# Patient Record
Sex: FEMALE | Race: WHITE | NOT HISPANIC OR LATINO | Employment: UNEMPLOYED | URBAN - METROPOLITAN AREA
[De-identification: names, ages, dates, MRNs, and addresses within clinical notes are randomized per-mention and may not be internally consistent; named-entity substitution may affect disease eponyms.]

---

## 2022-02-09 ENCOUNTER — HOSPITAL ENCOUNTER (INPATIENT)
Facility: HOSPITAL | Age: 42
LOS: 4 days | Discharge: HOME | End: 2022-02-13
Attending: OBSTETRICS & GYNECOLOGY | Admitting: OBSTETRICS & GYNECOLOGY

## 2022-02-09 PROBLEM — O13.9 GESTATIONAL HYPERTENSION: Status: ACTIVE | Noted: 2022-02-09

## 2022-02-09 LAB
ABO GROUP BLD: NORMAL
ABO GROUP BLD: NORMAL
ALP SERPL-CCNC: 247 U/L (ref 39–117)
ALT SERPL W P-5'-P-CCNC: 216 U/L (ref 1–33)
AST SERPL-CCNC: 203 U/L (ref 1–32)
BILIRUB SERPL-MCNC: 0.7 MG/DL (ref 0–1.2)
BLD GP AB SCN SERPL QL: NEGATIVE
CREAT SERPL-MCNC: 0.53 MG/DL (ref 0.57–1)
CREAT SERPL-MCNC: 0.53 MG/DL (ref 0.57–1)
DEPRECATED RDW RBC AUTO: 46.5 FL (ref 37–54)
ERYTHROCYTE [DISTWIDTH] IN BLOOD BY AUTOMATED COUNT: 13.5 % (ref 12.3–15.4)
GFR SERPL CREATININE-BSD FRML MDRD: 128 ML/MIN/1.73
GLUCOSE BLDC GLUCOMTR-MCNC: 132 MG/DL (ref 70–130)
GLUCOSE BLDC GLUCOMTR-MCNC: 94 MG/DL (ref 70–130)
HCT VFR BLD AUTO: 39.4 % (ref 34–46.6)
HGB BLD-MCNC: 13.5 G/DL (ref 12–15.9)
LDH SERPL-CCNC: 463 U/L (ref 135–214)
MCH RBC QN AUTO: 32.4 PG (ref 26.6–33)
MCHC RBC AUTO-ENTMCNC: 34.3 G/DL (ref 31.5–35.7)
MCV RBC AUTO: 94.5 FL (ref 79–97)
PLATELET # BLD AUTO: 317 10*3/MM3 (ref 140–450)
PMV BLD AUTO: 10.8 FL (ref 6–12)
RBC # BLD AUTO: 4.17 10*6/MM3 (ref 3.77–5.28)
RH BLD: POSITIVE
RH BLD: POSITIVE
SARS-COV-2 RDRP RESP QL NAA+PROBE: NORMAL
T&S EXPIRATION DATE: NORMAL
URATE SERPL-MCNC: 6.3 MG/DL (ref 2.4–5.7)
WBC NRBC COR # BLD: 14.17 10*3/MM3 (ref 3.4–10.8)

## 2022-02-09 PROCEDURE — 82247 BILIRUBIN TOTAL: CPT | Performed by: OBSTETRICS & GYNECOLOGY

## 2022-02-09 PROCEDURE — 84075 ASSAY ALKALINE PHOSPHATASE: CPT | Performed by: OBSTETRICS & GYNECOLOGY

## 2022-02-09 PROCEDURE — 86901 BLOOD TYPING SEROLOGIC RH(D): CPT | Performed by: OBSTETRICS & GYNECOLOGY

## 2022-02-09 PROCEDURE — 82962 GLUCOSE BLOOD TEST: CPT

## 2022-02-09 PROCEDURE — 82239 BILE ACIDS TOTAL: CPT | Performed by: OBSTETRICS & GYNECOLOGY

## 2022-02-09 PROCEDURE — 82565 ASSAY OF CREATININE: CPT | Performed by: OBSTETRICS & GYNECOLOGY

## 2022-02-09 PROCEDURE — 86850 RBC ANTIBODY SCREEN: CPT | Performed by: OBSTETRICS & GYNECOLOGY

## 2022-02-09 PROCEDURE — 86901 BLOOD TYPING SEROLOGIC RH(D): CPT

## 2022-02-09 PROCEDURE — 85027 COMPLETE CBC AUTOMATED: CPT | Performed by: OBSTETRICS & GYNECOLOGY

## 2022-02-09 PROCEDURE — 84460 ALANINE AMINO (ALT) (SGPT): CPT | Performed by: OBSTETRICS & GYNECOLOGY

## 2022-02-09 PROCEDURE — 86900 BLOOD TYPING SEROLOGIC ABO: CPT

## 2022-02-09 PROCEDURE — 59025 FETAL NON-STRESS TEST: CPT | Performed by: OBSTETRICS & GYNECOLOGY

## 2022-02-09 PROCEDURE — 84450 TRANSFERASE (AST) (SGOT): CPT | Performed by: OBSTETRICS & GYNECOLOGY

## 2022-02-09 PROCEDURE — 0 MAGNESIUM SULFATE 20 GM/500ML SOLUTION: Performed by: OBSTETRICS & GYNECOLOGY

## 2022-02-09 PROCEDURE — 99221 1ST HOSP IP/OBS SF/LOW 40: CPT | Performed by: OBSTETRICS & GYNECOLOGY

## 2022-02-09 PROCEDURE — 84550 ASSAY OF BLOOD/URIC ACID: CPT | Performed by: OBSTETRICS & GYNECOLOGY

## 2022-02-09 PROCEDURE — 83615 LACTATE (LD) (LDH) ENZYME: CPT | Performed by: OBSTETRICS & GYNECOLOGY

## 2022-02-09 PROCEDURE — 87635 SARS-COV-2 COVID-19 AMP PRB: CPT | Performed by: OBSTETRICS & GYNECOLOGY

## 2022-02-09 PROCEDURE — 86900 BLOOD TYPING SEROLOGIC ABO: CPT | Performed by: OBSTETRICS & GYNECOLOGY

## 2022-02-09 RX ORDER — LABETALOL HYDROCHLORIDE 5 MG/ML
20-80 INJECTION, SOLUTION INTRAVENOUS
Status: ACTIVE | OUTPATIENT
Start: 2022-02-09 | End: 2022-02-10

## 2022-02-09 RX ORDER — PRENATAL WITH FERROUS FUM AND FOLIC ACID 3080; 920; 120; 400; 22; 1.84; 3; 20; 10; 1; 12; 200; 27; 25; 2 [IU]/1; [IU]/1; MG/1; [IU]/1; MG/1; MG/1; MG/1; MG/1; MG/1; MG/1; UG/1; MG/1; MG/1; MG/1; MG/1
TABLET ORAL DAILY
COMMUNITY

## 2022-02-09 RX ORDER — MAGNESIUM SULFATE HEPTAHYDRATE 40 MG/ML
1 INJECTION, SOLUTION INTRAVENOUS CONTINUOUS
Status: DISCONTINUED | OUTPATIENT
Start: 2022-02-09 | End: 2022-02-13 | Stop reason: HOSPADM

## 2022-02-09 RX ORDER — BETAMETHASONE SODIUM PHOSPHATE AND BETAMETHASONE ACETATE 3; 3 MG/ML; MG/ML
12 INJECTION, SUSPENSION INTRA-ARTICULAR; INTRALESIONAL; INTRAMUSCULAR; SOFT TISSUE EVERY 24 HOURS
Status: DISCONTINUED | OUTPATIENT
Start: 2022-02-09 | End: 2022-02-09

## 2022-02-09 RX ORDER — SODIUM CHLORIDE 0.9 % (FLUSH) 0.9 %
1-10 SYRINGE (ML) INJECTION AS NEEDED
Status: DISCONTINUED | OUTPATIENT
Start: 2022-02-09 | End: 2022-02-13 | Stop reason: HOSPADM

## 2022-02-09 RX ORDER — LIDOCAINE HYDROCHLORIDE 10 MG/ML
5 INJECTION, SOLUTION EPIDURAL; INFILTRATION; INTRACAUDAL; PERINEURAL AS NEEDED
Status: DISCONTINUED | OUTPATIENT
Start: 2022-02-09 | End: 2022-02-13 | Stop reason: HOSPADM

## 2022-02-09 RX ORDER — FLUOXETINE 10 MG/1
10 CAPSULE ORAL DAILY
COMMUNITY

## 2022-02-09 RX ORDER — FLUOXETINE 10 MG/1
10 CAPSULE ORAL DAILY
Status: DISCONTINUED | OUTPATIENT
Start: 2022-02-10 | End: 2022-02-13 | Stop reason: HOSPADM

## 2022-02-09 RX ORDER — SODIUM CHLORIDE 0.9 % (FLUSH) 0.9 %
10 SYRINGE (ML) INJECTION EVERY 12 HOURS SCHEDULED
Status: DISCONTINUED | OUTPATIENT
Start: 2022-02-09 | End: 2022-02-13 | Stop reason: HOSPADM

## 2022-02-09 RX ORDER — ONDANSETRON 4 MG/1
4 TABLET, FILM COATED ORAL EVERY 8 HOURS PRN
Status: DISCONTINUED | OUTPATIENT
Start: 2022-02-09 | End: 2022-02-13 | Stop reason: HOSPADM

## 2022-02-09 RX ORDER — URSODIOL 300 MG/1
300 CAPSULE ORAL 3 TIMES DAILY
Status: DISCONTINUED | OUTPATIENT
Start: 2022-02-09 | End: 2022-02-11

## 2022-02-09 RX ORDER — ONDANSETRON 2 MG/ML
4 INJECTION INTRAMUSCULAR; INTRAVENOUS EVERY 8 HOURS PRN
Status: DISCONTINUED | OUTPATIENT
Start: 2022-02-09 | End: 2022-02-13 | Stop reason: HOSPADM

## 2022-02-09 RX ORDER — DEXTROSE AND SODIUM CHLORIDE 5; .2 G/100ML; G/100ML
100 INJECTION, SOLUTION INTRAVENOUS CONTINUOUS
Status: DISCONTINUED | OUTPATIENT
Start: 2022-02-09 | End: 2022-02-10

## 2022-02-09 RX ORDER — ACETAMINOPHEN 325 MG/1
650 TABLET ORAL EVERY 4 HOURS PRN
Status: DISCONTINUED | OUTPATIENT
Start: 2022-02-09 | End: 2022-02-13 | Stop reason: HOSPADM

## 2022-02-09 RX ORDER — HYDROXYZINE HYDROCHLORIDE 25 MG/1
50 TABLET, FILM COATED ORAL NIGHTLY PRN
Status: DISCONTINUED | OUTPATIENT
Start: 2022-02-09 | End: 2022-02-13 | Stop reason: HOSPADM

## 2022-02-09 RX ORDER — BETAMETHASONE SODIUM PHOSPHATE AND BETAMETHASONE ACETATE 3; 3 MG/ML; MG/ML
12 INJECTION, SUSPENSION INTRA-ARTICULAR; INTRALESIONAL; INTRAMUSCULAR; SOFT TISSUE ONCE
Status: COMPLETED | OUTPATIENT
Start: 2022-02-10 | End: 2022-02-10

## 2022-02-09 RX ORDER — LEVOTHYROXINE SODIUM 0.12 MG/1
125 TABLET ORAL DAILY
COMMUNITY

## 2022-02-09 RX ADMIN — MAGNESIUM SULFATE IN WATER 1 G/HR: 40 INJECTION, SOLUTION INTRAVENOUS at 19:00

## 2022-02-09 RX ADMIN — METFORMIN HYDROCHLORIDE 500 MG: 500 TABLET ORAL at 22:00

## 2022-02-09 RX ADMIN — URSODIOL 300 MG: 300 CAPSULE ORAL at 23:04

## 2022-02-09 RX ADMIN — HYDROXYZINE HYDROCHLORIDE 50 MG: 25 TABLET ORAL at 20:52

## 2022-02-09 NOTE — H&P
[facility]  Obstetric History and Physical    Chief Complaint   Patient presents with   • Elevated Hepatic Enzymes       Subjective     Patient is a 41 y.o. female  currently at 33w4d, who presents on referral from Dr. Chester in [place], [place].  Patient is transferred secondary to gestational hypertension with elevated hepatic transaminases.  Patient reports headache for the past 1-2 weeks.  She denies scotomata or epigastric pain.  She has no prior history of hypertensive disorders.  She is not currently complaining of any unusual itching.  Patient was seen in [place] and started on magnesium sulfate.  She received her first dose of  steroids.    Her prenatal care is notable for advanced maternal age. Her previous obstetric/gynecological history is notable for history of previous  section.    The following portions of the patients history were reviewed and updated as appropriate: current medications, allergies, past medical history, past surgical history, past family history, past social history and problem list .        Prenatal Information:   Maternal Prenatal Labs  Blood Type No results found for: ABO   Rh Status No results found for: RH   Antibody Screen No results found for: ABSCRN   Gonnorhea No results found for: GCCX   Chlamydia No results found for: CLAMYDCU   RPR No results found for: RPR   Syphilis Antibody No results found for: SYPHILIS   Rubella No results found for: RUBELLAIGGIN   Hepatitis B Surface Antigen No results found for: HEPBSAG   HIV-1 Antibody No results found for: LABHIV1   Hepatitis C Antibody No results found for: HEPCAB   Rapid Urin Drug Screen No results found for: AMPMETHU, BARBITSCNUR, LABBENZSCN, LABMETHSCN, LABOPIASCN, THCURSCR, COCAINEUR, AMPHETSCREEN, PROPOXSCN, BUPRENORSCNU, METAMPSCNUR, OXYCODONESCN, TRICYCLICSCN   Group B Strep Culture No results found for: GBSANTIGEN                 Past OB History:       OB History    Para Term  AB  "Living   6 3 3 0 1 3   SAB IAB Ectopic Molar Multiple Live Births   1 0 0 0 0 3      # Outcome Date GA Lbr [place]/2nd Weight Sex Delivery Anes PTL Lv   6 Current            5 Term 08 38w0d  3969 g (8 lb 12 oz) M Vag-Spont   ELIJAH   4 Term 05 39w4d  3685 g (8 lb 2 oz) F Vag-Spont   ELIJAH   3 Term 03 39w6d  3572 g (7 lb 14 oz) F Vag-Spont   ELIJAH   2             1 SAB  6w0d              Past Medical History:  Past Medical History:   Diagnosis Date   • Anxiety    • Chlamydia     20 yrs ago   • Depression    • Disease of thyroid gland    • Gestational diabetes    • Preeclampsia    • Seizures (HCC)     last was 14 years ago \"stress induced\"      Past Surgical History Past Surgical History:   Procedure Laterality Date   •  SECTION     • DILATATION AND CURETTAGE     • KNEE SURGERY Bilateral        • WISDOM TOOTH EXTRACTION        Family History: No family history on file.   Social History:  reports that she has never smoked. She has never used smokeless tobacco.   reports previous alcohol use.   reports no history of drug use.   Allergies: Penicillins  Current Medications:          No current facility-administered medications on file prior to encounter.     Current Outpatient Medications on File Prior to Encounter   Medication Sig Dispense Refill   • FLUoxetine (PROzac) 10 MG capsule Take 10 mg by mouth Daily.     • levothyroxine (SYNTHROID, LEVOTHROID) 125 MCG tablet Take 125 mcg by mouth Daily.     • metFORMIN (GLUCOPHAGE) 500 MG tablet Take 500 mg by mouth 2 (Two) Times a Day With Meals.     • Prenatal Vit-Fe Fumarate-FA (Prenatal 27-) 27-1 MG tablet tablet Take  by mouth Daily.         General ROS: Pertinent items are noted in HPI, all other systems reviewed and negative    Objective       Vital Signs Range for the last 24 hours  Temperature:     Temp Source:     BP: BP: (119)/(82) 119/82   Pulse: Heart Rate:  [80] 80   Respirations: Resp:  [18] 18   SPO2:     O2 Amount (l/min):     O2 " Devices     Weight: Weight:  [113 kg (250 lb)] 113 kg (250 lb)     Physical Examination: General appearance - alert, well appearing, and in no distress, oriented to person, place, and time and overweight  Mental status - alert, oriented to person, place, and time, normal mood, behavior, speech, dress, motor activity, and thought processes  Eyes - pupils equal and reactive, extraocular eye movements intact, sclera anicteric  Neck - supple, no significant adenopathy, thyroid exam: thyroid is normal in size without nodules or tenderness  Chest - clear to auscultation, no wheezes, rales or rhonchi, symmetric air entry  Heart - normal rate, regular rhythm, normal S1, S2, no murmurs, rubs, clicks or gallops  Abdomen-soft, nontender, nondistended, no masses or organomegaly   Abdomen, Non-Tender  Neurological - alert, oriented, normal speech, no focal findings or movement disorder noted, DTR's normal and symmetric  Extremities - no pedal edema noted    Fetal Heart Rate Assessment  Indication: hepatic dysfunction   Start Time: 1733                end Time: 1824   NST Results: Reactive NST.  Fetal heart rate baseline 125-135 bpm.  Average variability with 15 x 15 accelerations.  No decelerations no regular uterine contraction pattern.        Laboratory Results:   No results found for: ALKPHOS, ALT, AST, CREATININE, BILITOT, LDH, URICACID    No results found for: WBC, RBC, HGB, HCT, MCV, MCH, MCHC, RDW, RDWSD, MPV, PLT, NEUTRORELPCT, LYMPHORELPCT, MONORELPCT, EOSRELPCT, BASORELPCT, AUTOIGPER, NEUTROABS, LYMPHSABS, MONOSABS, EOSABS, BASOSABS, AUTOIGNUM, NRBC          Brief Urine Lab Results     None            Radiology Review: PDC ultrasound ordered.  Scheduled for tomorrow morning.  Other Studies: CBC, PEP, type and screen, bile salts, 24-hour urine for protein and creatinine clearance.  Also COVID-19 screening.    Assessment/Plan       Gestational hypertension        Assessment:  1. Gestational hypertension at 33  "weeks 4 days gestation.  No prior history of hypertensive disorders.  2. Previous  section.  3. Advanced maternal age.    Plan:  1. Admit.  2. Continue magnesium sulfate for seizure prophylaxis.  3. Second dose of  betamethasone tomorrow afternoon.  4. CBC, PEP, 24-hour urine for protein and creatinine clearance.  5. PDC ultrasound to assess fetal growth and Doppler studies.  6. MFM consultation.  7. 3 times daily  testing.     Total time spent today with Carmenza  was 40-54 minutes (level 5).  Of this time, > 50% was spent face-to-face time coordinating care, answering her questions and counseling regarding pathophysiology of her presenting problem along with plans for any diagnostic work-up and treatment.        Carl Singletary \"[place]\" PRIYA Duke MD  2022  18:21 EST    "

## 2022-02-10 ENCOUNTER — APPOINTMENT (OUTPATIENT)
Dept: WOMENS IMAGING | Facility: HOSPITAL | Age: 42
End: 2022-02-10

## 2022-02-10 PROBLEM — O34.211 MATERNAL CARE DUE TO LOW TRANSVERSE UTERINE SCAR FROM PREVIOUS CESAREAN DELIVERY: Status: ACTIVE | Noted: 2022-02-10

## 2022-02-10 PROBLEM — O09.523 MULTIGRAVIDA OF ADVANCED MATERNAL AGE IN THIRD TRIMESTER: Status: ACTIVE | Noted: 2022-02-10

## 2022-02-10 LAB
ALP SERPL-CCNC: 224 U/L (ref 39–117)
ALT SERPL W P-5'-P-CCNC: 258 U/L (ref 1–33)
AST SERPL-CCNC: 243 U/L (ref 1–32)
BASOPHILS # BLD AUTO: 0.02 10*3/MM3 (ref 0–0.2)
BASOPHILS NFR BLD AUTO: 0.1 % (ref 0–1.5)
BILIRUB SERPL-MCNC: 0.7 MG/DL (ref 0–1.2)
COLLECT DURATION TIME UR: 24 HRS
COLLECT DURATION TIME UR: 24 HRS
CREAT CL 24H UR+SERPL-VRATE: 112.1 ML/MIN (ref 88–128)
CREAT CL 24H UR+SERPL-VRATE: 161.5 L/24 HR (ref 126.7–184.3)
CREAT SERPL-MCNC: 0.58 MG/DL (ref 0.57–1)
CREAT UR-MCNC: 38.4 MG/DL
CREATINE 24H UR-MRATE: 1.23 G/24 HR (ref 0.7–1.6)
DEPRECATED RDW RBC AUTO: 47.4 FL (ref 37–54)
EOSINOPHIL # BLD AUTO: 0 10*3/MM3 (ref 0–0.4)
EOSINOPHIL NFR BLD AUTO: 0 % (ref 0.3–6.2)
ERYTHROCYTE [DISTWIDTH] IN BLOOD BY AUTOMATED COUNT: 13.7 % (ref 12.3–15.4)
GLUCOSE BLDC GLUCOMTR-MCNC: 109 MG/DL (ref 70–130)
GLUCOSE BLDC GLUCOMTR-MCNC: 118 MG/DL (ref 70–130)
GLUCOSE BLDC GLUCOMTR-MCNC: 123 MG/DL (ref 70–130)
GLUCOSE BLDC GLUCOMTR-MCNC: 133 MG/DL (ref 70–130)
HCT VFR BLD AUTO: 38.7 % (ref 34–46.6)
HGB BLD-MCNC: 13 G/DL (ref 12–15.9)
IMM GRANULOCYTES # BLD AUTO: 0.09 10*3/MM3 (ref 0–0.05)
IMM GRANULOCYTES NFR BLD AUTO: 0.6 % (ref 0–0.5)
LDH SERPL-CCNC: 338 U/L (ref 135–214)
LYMPHOCYTES # BLD AUTO: 1.6 10*3/MM3 (ref 0.7–3.1)
LYMPHOCYTES NFR BLD AUTO: 11.3 % (ref 19.6–45.3)
MCH RBC QN AUTO: 31.9 PG (ref 26.6–33)
MCHC RBC AUTO-ENTMCNC: 33.6 G/DL (ref 31.5–35.7)
MCV RBC AUTO: 94.9 FL (ref 79–97)
MONOCYTES # BLD AUTO: 0.64 10*3/MM3 (ref 0.1–0.9)
MONOCYTES NFR BLD AUTO: 4.5 % (ref 5–12)
NEUTROPHILS NFR BLD AUTO: 11.75 10*3/MM3 (ref 1.7–7)
NEUTROPHILS NFR BLD AUTO: 83.5 % (ref 42.7–76)
NRBC BLD AUTO-RTO: 0 /100 WBC (ref 0–0.2)
PLATELET # BLD AUTO: 290 10*3/MM3 (ref 140–450)
PMV BLD AUTO: 10.7 FL (ref 6–12)
PROT 24H UR-MRATE: 211.2 MG/24HOURS (ref 0–150)
RBC # BLD AUTO: 4.08 10*6/MM3 (ref 3.77–5.28)
SPECIMEN VOL 24H UR: 3200 ML
SPECIMEN VOL 24H UR: 3200 ML
URATE SERPL-MCNC: 6.1 MG/DL (ref 2.4–5.7)
WBC NRBC COR # BLD: 14.1 10*3/MM3 (ref 3.4–10.8)

## 2022-02-10 PROCEDURE — 25010000002 BETAMETHASONE ACET & SOD PHOS PER 4 MG: Performed by: OBSTETRICS & GYNECOLOGY

## 2022-02-10 PROCEDURE — 84450 TRANSFERASE (AST) (SGOT): CPT | Performed by: OBSTETRICS & GYNECOLOGY

## 2022-02-10 PROCEDURE — 0 MAGNESIUM SULFATE 20 GM/500ML SOLUTION: Performed by: OBSTETRICS & GYNECOLOGY

## 2022-02-10 PROCEDURE — 83615 LACTATE (LD) (LDH) ENZYME: CPT | Performed by: OBSTETRICS & GYNECOLOGY

## 2022-02-10 PROCEDURE — 84460 ALANINE AMINO (ALT) (SGPT): CPT | Performed by: OBSTETRICS & GYNECOLOGY

## 2022-02-10 PROCEDURE — 76811 OB US DETAILED SNGL FETUS: CPT

## 2022-02-10 PROCEDURE — 59025 FETAL NON-STRESS TEST: CPT

## 2022-02-10 PROCEDURE — 82565 ASSAY OF CREATININE: CPT | Performed by: OBSTETRICS & GYNECOLOGY

## 2022-02-10 PROCEDURE — 85025 COMPLETE CBC W/AUTO DIFF WBC: CPT | Performed by: OBSTETRICS & GYNECOLOGY

## 2022-02-10 PROCEDURE — 82247 BILIRUBIN TOTAL: CPT | Performed by: OBSTETRICS & GYNECOLOGY

## 2022-02-10 PROCEDURE — 84156 ASSAY OF PROTEIN URINE: CPT | Performed by: OBSTETRICS & GYNECOLOGY

## 2022-02-10 PROCEDURE — 76811 OB US DETAILED SNGL FETUS: CPT | Performed by: OBSTETRICS & GYNECOLOGY

## 2022-02-10 PROCEDURE — 84550 ASSAY OF BLOOD/URIC ACID: CPT | Performed by: OBSTETRICS & GYNECOLOGY

## 2022-02-10 PROCEDURE — 82575 CREATININE CLEARANCE TEST: CPT | Performed by: OBSTETRICS & GYNECOLOGY

## 2022-02-10 PROCEDURE — 99232 SBSQ HOSP IP/OBS MODERATE 35: CPT | Performed by: OBSTETRICS & GYNECOLOGY

## 2022-02-10 PROCEDURE — 84075 ASSAY ALKALINE PHOSPHATASE: CPT | Performed by: OBSTETRICS & GYNECOLOGY

## 2022-02-10 PROCEDURE — 82962 GLUCOSE BLOOD TEST: CPT

## 2022-02-10 RX ORDER — SODIUM CHLORIDE, SODIUM LACTATE, POTASSIUM CHLORIDE, CALCIUM CHLORIDE 600; 310; 30; 20 MG/100ML; MG/100ML; MG/100ML; MG/100ML
100 INJECTION, SOLUTION INTRAVENOUS CONTINUOUS
Status: DISCONTINUED | OUTPATIENT
Start: 2022-02-10 | End: 2022-02-13 | Stop reason: HOSPADM

## 2022-02-10 RX ADMIN — SODIUM CHLORIDE, POTASSIUM CHLORIDE, SODIUM LACTATE AND CALCIUM CHLORIDE 100 ML/HR: 600; 310; 30; 20 INJECTION, SOLUTION INTRAVENOUS at 23:56

## 2022-02-10 RX ADMIN — SODIUM CHLORIDE, POTASSIUM CHLORIDE, SODIUM LACTATE AND CALCIUM CHLORIDE 100 ML/HR: 600; 310; 30; 20 INJECTION, SOLUTION INTRAVENOUS at 13:56

## 2022-02-10 RX ADMIN — METFORMIN HYDROCHLORIDE 500 MG: 500 TABLET ORAL at 08:45

## 2022-02-10 RX ADMIN — HYDROXYZINE HYDROCHLORIDE 50 MG: 25 TABLET ORAL at 23:38

## 2022-02-10 RX ADMIN — URSODIOL 300 MG: 300 CAPSULE ORAL at 21:41

## 2022-02-10 RX ADMIN — URSODIOL 300 MG: 300 CAPSULE ORAL at 16:56

## 2022-02-10 RX ADMIN — FLUOXETINE 10 MG: 10 CAPSULE ORAL at 09:45

## 2022-02-10 RX ADMIN — URSODIOL 300 MG: 300 CAPSULE ORAL at 08:45

## 2022-02-10 RX ADMIN — LEVOTHYROXINE SODIUM 125 MCG: 25 TABLET ORAL at 06:28

## 2022-02-10 RX ADMIN — SODIUM CHLORIDE, POTASSIUM CHLORIDE, SODIUM LACTATE AND CALCIUM CHLORIDE 100 ML/HR: 600; 310; 30; 20 INJECTION, SOLUTION INTRAVENOUS at 03:40

## 2022-02-10 RX ADMIN — BETAMETHASONE SODIUM PHOSPHATE AND BETAMETHASONE ACETATE 12 MG: 3; 3 INJECTION, SUSPENSION INTRA-ARTICULAR; INTRALESIONAL; INTRAMUSCULAR at 16:56

## 2022-02-10 RX ADMIN — MAGNESIUM SULFATE IN WATER 1 G/HR: 40 INJECTION, SOLUTION INTRAVENOUS at 12:38

## 2022-02-10 RX ADMIN — ACETAMINOPHEN 650 MG: 325 TABLET, FILM COATED ORAL at 08:45

## 2022-02-10 RX ADMIN — METFORMIN HYDROCHLORIDE 500 MG: 500 TABLET ORAL at 17:34

## 2022-02-10 NOTE — CONSULTS
[facility]  Obstetric History and Physical  Referring Provider: Surendra Chester; MATT Chapman    Chief Complaint   Patient presents with   • Elevated Hepatic Enzymes       Subjective     Patient is a 41 y.o. female  currently at 33w5d, who presents with Itching, elevated BP and proteinuria.  Patient presented to [place] with c/o itching.  Noted to have elevated BP and proteinuria.  AST, ALT elevated, as well as Uric Acid.  Patient placed on Mag and transferred to [facility].  On admission here, itching is improved, BPs are normal.  No HA, [place]omata or Epigastric pain.  Prior pregnancy complicated by preeclampsia.  Prior C/S, plans repeat.  No family history of Preeclampsia or liver disease.  She does not smoke or use illicit drugs.    Her prenatal care is benign.  Her previous obstetric/gynecological history is noted for is non-contributory.        Prenatal Information:   Maternal Prenatal Labs  Blood Type ABO Type   Date Value Ref Range Status   2022 A  Final      Rh Status RH type   Date Value Ref Range Status   2022 Positive  Final      Antibody Screen Antibody Screen   Date Value Ref Range Status   2022 Negative  Final      Gonnorhea No results found for: GCCX   Chlamydia No results found for: CLAMYDCU   RPR No results found for: RPR       Rubella No results found for: RUBELLAIGGIN   Hepatitis B Surface Antigen No results found for: HEPBSAG   HIV-1 Antibody No results found for: LABHIV1   Hepatitis C Antibody No results found for: HEPCAB   Rapid Urin Drug Screen No results found for: AMPMETHU, BARBITSCNUR, LABBENZSCN, LABMETHSCN, LABOPIASCN, THCURSCR, COCAINEUR, AMPHETSCREEN, PROPOXSCN, BUPRENORSCNU, METAMPSCNUR, OXYCODONESCN, TRICYCLICSCN   Group B Strep Culture No results found for: GBSANTIGEN           External Prenatal Results     Pregnancy Outside Results - Transcribed From Office Records - See Scanned Records For Details     Test Value Date Time    ABO  A  22 2306    Rh  Positive  " 22 2306    Antibody Screen  Negative  22    Varicella IgG       Rubella       Hgb  13.0 g/dL 02/10/22 0605       13.5 g/dL 22    Hct  38.7 % 02/10/22 0605       39.4 % 22    Glucose Fasting GTT       Glucose Tolerance Test 1 hour       Glucose Tolerance Test 3 hour       Gonorrhea (discrete)       Chlamydia (discrete)       RPR       VDRL       Syphilis Antibody       HBsAg       Herpes Simplex Virus PCR       Herpes Simplex VIrus Culture       HIV       Hep C RNA Quant PCR       Hep C Antibody       AFP       Group B Strep       GBS Susceptibility to Clindamycin       GBS Susceptibility to Erythromycin       Fetal Fibronectin       Genetic Testing, Maternal Blood             Drug Screening     Test Value Date Time    Urine Drug Screen       Amphetamine Screen       Barbiturate Screen       Benzodiazepine Screen       Methadone Screen       Phencyclidine Screen       Opiates Screen       THC Screen       Cocaine Screen       Propoxyphene Screen       Buprenorphine Screen       Methamphetamine Screen       Oxycodone Screen       Tricyclic Antidepressants Screen             Legend    ^: Historical                          Past OB History:       OB History    Para Term  AB Living   7 4 4 0 2 4   SAB IAB Ectopic Molar Multiple Live Births   1 0 0 0 0 4      # Outcome Date GA Lbr Col[place]/2nd Weight Sex Delivery Anes PTL Lv   7 Current            6 AB 19 8w0d    SAB      5 Term 10/12/15    F CS-Unspec   ELIJAH   4 Term 08 38w0d  3969 g (8 lb 12 oz) M Vag-Spont   ELIJAH   3 Term 05 39w4d  3685 g (8 lb 2 oz) F Vag-Spont   ELIJAH   2 Term 03 39w6d  3572 g (7 lb 14 oz) F Vag-Spont   ELIJAH   1 SAB  6w0d              Past Medical History: Past Medical History:   Diagnosis Date   • Anxiety    • Chlamydia     20 yrs ago   • Depression    • Disease of thyroid gland    • Gestational diabetes    • Preeclampsia    • Seizures (HCC)     last was 14 years ago \"stress " "induced\"      Past Surgical History Past Surgical History:   Procedure Laterality Date   •  SECTION     • DILATATION AND CURETTAGE     • KNEE SURGERY Bilateral        • WISDOM TOOTH EXTRACTION        Family History: No family history on file.   Social History:  reports that she has never smoked. She has never used smokeless tobacco.   reports previous alcohol use.   reports no history of drug use.        General ROS: Pertinent items are noted in HPI, all other systems reviewed and negative    Objective       Vital Signs Range for the last 24 hours  Temperature: Temp:  [97.8 °F (36.6 °C)-98.2 °F (36.8 °C)] 97.8 °F (36.6 °C)   Temp Source: Temp src: Oral   BP: BP: (106-136)/(59-84) 131/74   Pulse: Heart Rate:  [72-92] 81   Respirations: Resp:  [16-18] 16               Weight: Weight:  [113 kg (250 lb)] 113 kg (250 lb)     Physical Examination: General appearance - alert, well appearing, and in no distress  Mental status - alert, oriented to person, place, and time  Eyes - sclera anicteric, left eye normal, right eye normal  Ears - right ear normal, left ear normal  Mouth - mucous membranes moist, pharynx normal without lesions  Neck - supple, no significant adenopathy  Chest - no tachypnea, retractions or cyanosis  Heart - normal rate and regular rhythm  Abdomen - Gravid, nontender  Back exam - full range of motion, no tenderness, palpable spasm or pain on motion  Neurological - alert, oriented, normal speech, no focal findings or movement disorder noted  Musculoskeletal - no joint tenderness, deformity or swelling  Extremities - peripheral pulses normal, no pedal edema, no clubbing or cyanosis  Skin - normal coloration and turgor, no rashes, no suspicious skin lesions noted    Presentation: Vertex   Cervix: Exam by:     Dilation:     Effacement:     Station:         Fetal Heart Rate Assessment   Method: Fetal HR Assessment Method: external   Beats/min: Fetal HR (beats/min): 145   Baseline: Fetal Heart " Baseline Rate: normal range   Varibility: Fetal HR Variability: moderate (amplitude range 6 to 25 bpm)   Accels: Fetal HR Accelerations: prolonged, greater than/equal to 15 bpm, lasting at least 15 seconds   Decels: Fetal HR Decelerations: absent   Tracing Category:       Uterine Assessment   Method: Method: external tocotransducer   Frequency (min):     Ctx Count in 10 min:     Duration:     Intensity: Contraction Intensity: no contractions   Intensity by IUPC:     Resting Tone: Uterine Resting Tone: soft by palpation   Resting Tone by IUPC:     [place] Units:       Laboratory Results: elevated, AST, ALT, Uric Acid  Normal PLTs  Radiology Review: S=D, normal PETROS and dopplers  See viewpoint by me  Other Studies: reactive tracing    Assessment/Plan       Gestational hypertension        Assessment:  1.  Intrauterine pregnancy at 33w5d weeks gestation with reactive fetal status.    2.  pre-eclampsia mild v cholestasis  3.  Obstetrical history significant for is non-contributory.  4.  GBS status: No results found for: GBSANTIGEN    Plan:  1. fetal and uterine monitoring  intermittent, maternal labs, 24 hour urine for  total protein and creatinine clearance and steroids for fetal benefits  2. Plan of care has been reviewed with patient.  3.  Risks, benefits of treatment plan have been discussed.  4.  All questions have been answered.      Douglas A. Milligan, MD  2/10/2022  08:02 EST

## 2022-02-10 NOTE — PAYOR COMM NOTE
"Carmenza Villeda (41 y.o. Female)     [place] Medicaid ID#[id number]    Medical Inpatient Admission.    From: Angella Burkett LPN, Utilization Review  Phone #[phone]  Fax #[phone]              Date of Birth Social Security Number Address Home Phone MRN    1980  [address] [place] [id number] [phone] [id number]    Sabianism Marital Status             Anglican        Admission Date Admission Type Admitting Provider Attending Provider Department, Room/Bed    22 Elective Carl Duke III, MD Allen, George Sea III, MD [facility] ANTEPARTUM, N325    Discharge Date Discharge Disposition Discharge Destination                         Attending Provider: Carl Duke III, MD    Allergies: Penicillins    Isolation: None   Infection: None   Code Status: CPR   Advance Care Planning Activity    Ht: 175.3 cm (69\")   Wt: 113 kg (250 lb)    Admission Cmt: None   Principal Problem: None                Active Insurance as of 2022     Primary Coverage     Payor Plan Insurance Group Employer/Plan Group    [facility] MEDICAID [facility] MEDICAID [id number]     Payor Plan Address Payor Plan Phone Number Payor Plan Fax Number Effective Dates    [address] [address] [phone]  2021 - None Entered    [facility] [id number]       Subscriber Name Subscriber Birth Date Member ID       CARMENZA VILLEDA 1980 [id number]                 Emergency Contacts      (Rel.) Home Phone Work Phone Mobile Phone    GUTIERREZ SIMON (Spouse) -- -- [phone]            Insurance Information                [facility] MEDICAID/ANTHEM MEDICAID Phone: [phone]    Subscriber: Ronal Carmenza Subscriber#: [id number]    Group#: [id number] Precert#: --          Problem List           Codes Noted - Resolved       Hospital    Maternal care due to low transverse uterine scar from previous  delivery ICD-10-CM: O34.211  ICD-9-CM: 654.20 2/10/2022 - Present    Multigravida of advanced " maternal age in third trimester ICD-10-CM: O09.523  ICD-9-CM: 659.63 2/10/2022 - Present    Gestational hypertension ICD-10-CM: O13.9  ICD-9-CM: 642.30 2022 - Present             History & Physical      Carl Duke III, MD at 22 1821          [facility]  Obstetric History and Physical    Chief Complaint   Patient presents with   • Elevated Hepatic Enzymes       Subjective     Patient is a 41 y.o. female  currently at 33w4d, who presents on referral from Dr. Chester in [place], [place].  Patient is transferred secondary to gestational hypertension with elevated hepatic transaminases.  Patient reports headache for the past 1-2 weeks.  She denies scotomata or epigastric pain.  She has no prior history of hypertensive disorders.  She is not currently complaining of any unusual itching.  Patient was seen in [place] and started on magnesium sulfate.  She received her first dose of  steroids.    Her prenatal care is notable for advanced maternal age. Her previous obstetric/gynecological history is notable for history of previous  section.    The following portions of the patients history were reviewed and updated as appropriate: current medications, allergies, past medical history, past surgical history, past family history, past social history and problem list .        Prenatal Information:   Maternal Prenatal Labs  Blood Type No results found for: ABO   Rh Status No results found for: RH   Antibody Screen No results found for: ABSCRN   Gonnorhea No results found for: GCCX   Chlamydia No results found for: CLAMYDCU   RPR No results found for: RPR   Syphilis Antibody No results found for: SYPHILIS   Rubella No results found for: RUBELLAIGGIN   Hepatitis B Surface Antigen No results found for: HEPBSAG   HIV-1 Antibody No results found for: LABHIV1   Hepatitis C Antibody No results found for: HEPCAB   Rapid Urin Drug Screen No results found for: AMPMETHU, BARBITSCNUR, LABBENZSCN,  "LABMETHSCN, LABOPIASCN, THCURSCR, COCAINEUR, AMPHETSCREEN, PROPOXSCN, BUPRENORSCNU, METAMPSCNUR, OXYCODONESCN, TRICYCLICSCN   Group B Strep Culture No results found for: GBSANTIGEN                 Past OB History:       OB History    Para Term  AB Living   6 3 3 0 1 3   SAB IAB Ectopic Molar Multiple Live Births   1 0 0 0 0 3      # Outcome Date GA Lbr Colton/2nd Weight Sex Delivery Anes PTL Lv   6 Current            5 Term 08 38w0d  3969 g (8 lb 12 oz) M Vag-Spont   ELIJAH   4 Term 05 39w4d  3685 g (8 lb 2 oz) F Vag-Spont   ELIJAH   3 Term 03 39w6d  3572 g (7 lb 14 oz) F Vag-Spont   ELIJAH   2             1 SAB  6w0d              Past Medical History:  Past Medical History:   Diagnosis Date   • Anxiety    • Chlamydia     20 yrs ago   • Depression    • Disease of thyroid gland    • Gestational diabetes    • Preeclampsia    • Seizures (HCC)     last was 14 years ago \"stress induced\"      Past Surgical History Past Surgical History:   Procedure Laterality Date   •  SECTION     • DILATATION AND CURETTAGE     • KNEE SURGERY Bilateral        • WISDOM TOOTH EXTRACTION        Family History: No family history on file.   Social History:  reports that she has never smoked. She has never used smokeless tobacco.   reports previous alcohol use.   reports no history of drug use.   Allergies: Penicillins  Current Medications:          No current facility-administered medications on file prior to encounter.     Current Outpatient Medications on File Prior to Encounter   Medication Sig Dispense Refill   • FLUoxetine (PROzac) 10 MG capsule Take 10 mg by mouth Daily.     • levothyroxine (SYNTHROID, LEVOTHROID) 125 MCG tablet Take 125 mcg by mouth Daily.     • metFORMIN (GLUCOPHAGE) 500 MG tablet Take 500 mg by mouth 2 (Two) Times a Day With Meals.     • Prenatal Vit-Fe Fumarate-FA (Prenatal 27-) 27-1 MG tablet tablet Take  by mouth Daily.         General ROS: Pertinent items are noted in HPI, " all other systems reviewed and negative    Objective       Vital Signs Range for the last 24 hours  Temperature:     Temp Source:     BP: BP: (119)/(82) 119/82   Pulse: Heart Rate:  [80] 80   Respirations: Resp:  [18] 18   SPO2:     O2 Amount (l/min):     O2 Devices     Weight: Weight:  [113 kg (250 lb)] 113 kg (250 lb)     Physical Examination: General appearance - alert, well appearing, and in no distress, oriented to person, place, and time and overweight  Mental status - alert, oriented to person, place, and time, normal mood, behavior, speech, dress, motor activity, and thought processes  Eyes - pupils equal and reactive, extraocular eye movements intact, sclera anicteric  Neck - supple, no significant adenopathy, thyroid exam: thyroid is normal in size without nodules or tenderness  Chest - clear to auscultation, no wheezes, rales or rhonchi, symmetric air entry  Heart - normal rate, regular rhythm, normal S1, S2, no murmurs, rubs, clicks or gallops  Abdomen-soft, nontender, nondistended, no masses or organomegaly   Abdomen, Non-Tender  Neurological - alert, oriented, normal speech, no focal findings or movement disorder noted, DTR's normal and symmetric  Extremities - no pedal edema noted    Fetal Heart Rate Assessment  Indication:   Start Time: 1733                end Time: 1824   NST Results: Reactive NST.  Fetal heart rate baseline 125-135 bpm.  Average variability with 15 x 15 accelerations.  No decelerations no regular uterine contraction pattern.        Laboratory Results:   No results found for: ALKPHOS, ALT, AST, CREATININE, BILITOT, LDH, URICACID    No results found for: WBC, RBC, HGB, HCT, MCV, MCH, MCHC, RDW, RDWSD, MPV, PLT, NEUTRORELPCT, LYMPHORELPCT, MONORELPCT, EOSRELPCT, BASORELPCT, AUTOIGPER, NEUTROABS, LYMPHSABS, MONOSABS, EOSABS, BASOSABS, AUTOIGNUM, NRBC          Brief Urine Lab Results     None            Radiology Review: PDC ultrasound ordered.  Scheduled for tomorrow  "morning.  Other Studies: CBC, PEP, type and screen, bile salts, 24-hour urine for protein and creatinine clearance.  Also COVID-19 screening.    Assessment/Plan       Gestational hypertension        Assessment:  1. Gestational hypertension at 33 weeks 4 days gestation.  No prior history of hypertensive disorders.  2. Previous  section.  3. Advanced maternal age.    Plan:  1. Admit.  2. Continue magnesium sulfate for seizure prophylaxis.  3. Second dose of  betamethasone tomorrow afternoon.  4. CBC, PEP, 24-hour urine for protein and creatinine clearance.  5. PDC ultrasound to assess fetal growth and Doppler studies.  6. MFM consultation.  7. 3 times daily  testing.     Total time spent today with Carmenza  was 40-54 minutes (level 5).  Of this time, > 50% was spent face-to-face time coordinating care, answering her questions and counseling regarding pathophysiology of her presenting problem along with plans for any diagnostic work-up and treatment.        Carl Singletary \"[place]\" PRIYA Duke MD  2022  18:21 EST      Electronically signed by Carl Duke III, MD at 22 1825       Vital Signs (last day)     Date/Time Temp Temp src Pulse Resp BP Patient Position SpO2    02/10/22 0749 -- -- 81 -- -- -- 96    02/10/22 0748 97.8 (36.6) -- 81 16 131/74 -- --    02/10/22 0629 -- -- 83 18 111/66 Lying 98    02/10/22 0533 97.9 (36.6) Oral 77 18 111/59 Lying 95    02/10/22 0440 -- -- 89 16 106/66 Lying 96    02/10/22 0337 -- -- 84 18 110/67 Lying 96    02/10/22 0237 -- -- 72 16 115/74 Lying 95    02/10/22 0135 -- -- 79 16 112/72 Lying 96    02/10/22 0032 -- -- 79 16 116/68 Lying 98    226 -- -- 80 16 112/71 Lying 96    22 -- -- 92 16 117/74 Lying 95    22 -- -- 86 16 124/76 Lying 96    22 98.2 (36.8) Oral 88 16 126/76 Lying 96    22 -- -- 83 -- -- -- 95    22 -- -- 88 16 119/81 -- --    22 -- -- 87 16 136/84 -- --    22 " 1739 -- -- 80 18 119/82 -- --            Facility-Administered Medications as of 2/10/2022   Medication Dose Route Frequency Provider Last Rate Last Admin   • acetaminophen (TYLENOL) tablet 650 mg  650 mg Oral Q4H PRN Carl Duke III, MD       • betamethasone acetate-betamethasone sodium phosphate (CELESTONE SOLUSPAN) injection 12 mg  12 mg Intramuscular Once Carl Duke III, MD       • FLUoxetine (PROzac) capsule 10 mg  10 mg Oral Daily Carl Duke III, MD       • hydrOXYzine (ATARAX) tablet 50 mg  50 mg Oral Nightly PRN Carl Duke III, MD   50 mg at 02/09/22 2052   • labetalol (NORMODYNE,TRANDATE) injection 20-80 mg  20-80 mg Intravenous Q10 Min PRN Carl Duke III, MD       • lactated ringers infusion  100 mL/hr Intravenous Continuous Marek Marinelli  mL/hr at 02/10/22 0340 100 mL/hr at 02/10/22 0340   • levothyroxine (SYNTHROID, LEVOTHROID) tablet 125 mcg  125 mcg Oral Q AM Carl Duke III, MD   125 mcg at 02/10/22 0628   • lidocaine PF 1% (XYLOCAINE) injection 5 mL  5 mL Intradermal PRN Carl Duke III, MD       • magnesium sulfate 20 GM/500ML infusion  1 g/hr Intravenous Continuous Carl Duke III, MD 25 mL/hr at 02/10/22 0629 1 g/hr at 02/10/22 0629   • metFORMIN (GLUCOPHAGE) tablet 500 mg  500 mg Oral BID With Meals Marek Marinelli MD   500 mg at 02/09/22 2200   • ondansetron (ZOFRAN) tablet 4 mg  4 mg Oral Q8H PRN Carl Duke III, MD        Or   • ondansetron (ZOFRAN) injection 4 mg  4 mg Intravenous Q8H PRN Carl Duke III, MD       • sodium chloride 0.9 % flush 1-10 mL  1-10 mL Intravenous PRN Carl Duke III, MD       • sodium chloride 0.9 % flush 10 mL  10 mL Intravenous Q12H Carl Duke III, MD       • ursodiol (ACTIGALL) capsule 300 mg  300 mg Oral TID Marek Marinelli MD   300 mg at 02/09/22 0280       Lab Results (last 24 hours)     Procedure Component Value Units Date/Time    Preeclampsia Panel [[id number]]  (Abnormal)  Collected: 02/10/22 0605    Specimen: Blood Updated: 02/10/22 0803     Alkaline Phosphatase 224 U/L      ALT (SGPT) 258 U/L      AST (SGOT) 243 U/L      Creatinine 0.58 mg/dL      Total Bilirubin 0.7 mg/dL       U/L      Comment: Specimen hemolyzed.  Results may be affected.        Uric Acid 6.1 mg/dL     POC Glucose Once [[id number]]  (Normal) Collected: 02/10/22 0710    Specimen: Blood Updated: 02/10/22 0712     Glucose 123 mg/dL      Comment: Meter: [id number] : [id number] Jenny Batista       CBC & Differential [[id number]]  (Abnormal) Collected: 02/10/22 0605    Specimen: Blood Updated: 02/10/22 0656    Narrative:      The following orders were created for panel order CBC & Differential.  Procedure                               Abnormality         Status                     ---------                               -----------         ------                     CBC Auto Differential[[id number]]        Abnormal            Final result                 Please view results for these tests on the individual orders.    CBC Auto Differential [[id number]]  (Abnormal) Collected: 02/10/22 0605    Specimen: Blood Updated: 02/10/22 0656     WBC 14.10 10*3/mm3      RBC 4.08 10*6/mm3      Hemoglobin 13.0 g/dL      Hematocrit 38.7 %      MCV 94.9 fL      MCH 31.9 pg      MCHC 33.6 g/dL      RDW 13.7 %      RDW-SD 47.4 fl      MPV 10.7 fL      Platelets 290 10*3/mm3      Neutrophil % 83.5 %      Lymphocyte % 11.3 %      Monocyte % 4.5 %      Eosinophil % 0.0 %      Basophil % 0.1 %      Immature Grans % 0.6 %      Neutrophils, Absolute 11.75 10*3/mm3      Lymphocytes, Absolute 1.60 10*3/mm3      Monocytes, Absolute 0.64 10*3/mm3      Eosinophils, Absolute 0.00 10*3/mm3      Basophils, Absolute 0.02 10*3/mm3      Immature Grans, Absolute 0.09 10*3/mm3      nRBC 0.0 /100 WBC     POC Glucose Once [[id number]]  (Abnormal) Collected: 02/09/22 2132    Specimen: Blood Updated: 02/09/22 2133     Glucose 132 mg/dL      Comment:  Meter: [id number] : [id number] Kylah Samuel       Preeclampsia Panel [[id number]]  (Abnormal) Collected: 02/09/22 2014    Specimen: Blood Updated: 02/09/22 2050     Alkaline Phosphatase 247 U/L      ALT (SGPT) 216 U/L      Comment: Specimen hemolyzed.  Results may be affected.        AST (SGOT) 203 U/L      Comment: Specimen hemolyzed.  Results may be affected.        Creatinine 0.53 mg/dL      Total Bilirubin 0.7 mg/dL       U/L      Comment: Specimen hemolyzed.  Results may be affected.        Uric Acid 6.3 mg/dL     Creatinine Clearance - Urine, Clean Catch [[id number]]  (Abnormal) Collected: 02/09/22 2014    Specimen: 24 Hour Urine from Urine, Clean Catch Updated: 02/09/22 2049    Narrative:      The following orders were created for panel order Creatinine Clearance - Urine, Clean Catch.  Procedure                               Abnormality         Status                     ---------                               -----------         ------                     Creatinine clearance serum[[id number]]   Abnormal            Final result               Creatinine Clearance, Ur...[[id number]]                                                   Please view results for these tests on the individual orders.    Creatinine clearance serum [[id number]]  (Abnormal) Collected: 02/09/22 2014    Specimen: Blood Updated: 02/09/22 2049     Creatinine 0.53 mg/dL      eGFR Non African Amer 128 mL/min/1.73     Narrative:      GFR Normal >60  Chronic Kidney Disease <60  Kidney Failure <15      CBC (No Diff) [[id number]]  (Abnormal) Collected: 02/09/22 2014    Specimen: Blood Updated: 02/09/22 2033     WBC 14.17 10*3/mm3      RBC 4.17 10*6/mm3      Hemoglobin 13.5 g/dL      Hematocrit 39.4 %      MCV 94.5 fL      MCH 32.4 pg      MCHC 34.3 g/dL      RDW 13.5 %      RDW-SD 46.5 fl      MPV 10.8 fL      Platelets 317 10*3/mm3     Bile Acids, Total [[id number]] Collected: 02/09/22 2014    Specimen: Blood Updated: 02/09/22 2028    POC  Glucose Once [[id number]]  (Normal) Collected: 22    Specimen: Blood Updated: 22     Glucose 94 mg/dL      Comment: Meter: [id number] : [id number] [place]       COVID PRE-OP / PRE-PROCEDURE SCREENING ORDER (NO ISOLATION) - Swab, Nasopharynx [[id number]]  (Normal) Collected: 22    Specimen: Swab from Nasopharynx Updated: 22    Narrative:      The following orders were created for panel order COVID PRE-OP / PRE-PROCEDURE SCREENING ORDER (NO ISOLATION) - Swab, Nasopharynx.  Procedure                               Abnormality         Status                     ---------                               -----------         ------                     COVID-19, ABBOTT IN-HOUS...[[id number]]  Normal              Final result                 Please view results for these tests on the individual orders.    COVID-19, ABBOTT IN-HOUSE,NASAL Swab (NO TRANSPORT MEDIA) 2 HR TAT - Swab, Nasopharynx [[id number]]  (Normal) Collected: 22    Specimen: Swab from Nasopharynx Updated: 22     COVID19 Presumptive Negative    Narrative:      Fact sheet for providers: https://www.fda.gov/media/752704/download     Fact sheet for patients: https://www.fda.gov/media/094894/download    Test performed by PCR.  If inconsistent with clinical signs and symptoms patient should be tested with different authorized molecular test.        Imaging Results (Last 24 Hours)     Procedure Component Value Units Date/Time    [facility] [[id number]] Collected: 02/10/22 0800     Updated: 02/10/22 0823    Narrative:      PAT NAME: SYDNEY PRIETO  MED REC#: [id number]  BIRTH DA: [id number]  PAT GEND: F  ACCOUNT#: [id number]  PAT TYPE: I  EXAM PASHA: [id number]  REF PHYS TREE ALEMAN  ACCESSION [id number]      Patient Status  ============    Inpatient      Indication  ========    CHTN, Cholelastasis, Previous  x 1      Method  =======    Transabdominal ultrasound  examination. View: Adequate view      Pregnancy  =========    Avila pregnancy. Number of fetuses: 1      Dating  ======    Method of dating: based on stated CATHERINE  GA by prior assessment 33 w + 5 d  CATHERINE by prior assessment: 3/26/2022  Ultrasound examination on: 2/10/2022  GA by U/S based upon: AC, BPD, Femur, HC  GA by U/S 34 w + 2 d  CATHERINE by U/S: 3/22/2022  Assigned: based on stated CATHERINE, selected on 02/10/2022  Assigned GA 33 w + 5 d  Assigned CATHERINE: 3/26/2022  Pregnancy length 280 d      Fetal Biometry  ============    Standard  BPD 81.9 mm  32w 6d        24%        Hadlock    .8 mm  35w 5d        67%        Hadlock    Cerebellum tr 49.4 mm  36w 3d        86%        Hill    .6 mm  34w 5d        80%        Hadlock    Femur 65.8 mm  33w 6d        45%        Hadlock    HC / AC 1.03    EFW 2,424 g          57%        Bimal    EFW (lb) 5 lb  EFW (oz) 6 oz  EFW by: Hadlock (BPD-HC-AC-FL)  Extended  Cav. septi pel. tr 7.6 mm   5.4 mm  CM 5.7 mm          10%        Nicolaides    Extremities / Bony Struc  FL / BPD 0.80    FL / HC 0.21    FL / AC 0.21    Other Structures   bpm      General Evaluation  ================    Cardiac activity present.  bpm.  Fetal movements present.  Presentation cephalic.  Placenta anterior, Grade 1.  Umbilical cord Cord vessels: 3 vessel cord. Insertion site: placental insertion: normal.  Amniotic fluid Amount of AF: normal. MVP 6.1 cm. PETROS 15.6 cm. Q1 6.1 cm, Q2 3.2 cm, Q3 2.7 cm, Q4 3.6 cm.      Fetal Anatomy  ============    Cranium: Appears normal  Midline falx: Appears normal  Cavum septi pellucidi: Appears normal  Cerebellum: Appears normal  Cisterna magna: Appears normal  Head / Neck  Rt lateral ventricle: Appears normal  Lt lateral ventricle: Appears normal  Rt choroid plexus: Appears normal  Lt choroid plexus: Appears normal  Vermis: Appears normal  Neck: Appears normal  Nuchal fold: Appears normal  Lips: Appear normal  Profile: Appears  normal  Nose: Appears normal  Face  Nose: Nasal bone present  Palate: Appears normal  Orbits: Appears normal  Lens: Normal  4-chamber view: Appears normal  RVOT view: Appears normal  LVOT view: Appears normal  Heart / Thorax  Aortic arch view: Appears normal  Ductal arch view: Appears normal  SVC: normal  IVC: normal  3-vessel view: Appears normal  3-vessel-trachea view: Appears normal  Rt lung: Appears normal  Lt lung: normal  Diaphragm: Appears normal  Diaphragm: Intact  Cord insertion: Appears normal  Stomach: Appears normal  Bladder: Appears normal  Abdomen  Rt kidney: normal  Lt kidney: normal  Liver: normal  Small bowel: normal  Large bowel: normal  Cervical spine: Appears normal  Thoracic spine: Appears normal  Lumbar spine: Appears normal  Sacral spine: Appears normal  Arms: Appears normal  Legs: Appears normal  Rt upper arm: Appears normal  Rt forearm: Appears normal  Rt hand: Appears normal  Lt upper arm: Appears normal  Lt forearm: Appears normal  Lt hand: Appears normal  Rt upper leg: Appears normal  Rt lower leg: Appears normal  Rt foot: Appears normal  Lt upper leg: Appears normal  Lt lower leg: Appears normal  Lt foot: Appears normal  Gender: male  Wants to know gender: yes      Fetal Doppler  ===========    Arterial  Umbilical A PI 0.73          18%        Venancio    Umbilical A RI 0.51          13%        Venancio    Umbilical A PS -37.92 cm/s    Umbilical A ED -18.45 cm/s  Umbilical A TAmax -26.66 cm/s    Umbilical A MD -17.69 cm/s  Umbilical A S / D 2.06          16%        Venancio    Umbilical A  bpm      Maternal Structures  ================    Uterus / Cervix  Cervical length 44.7 mm  Ovaries / Tubes / Adnexa  Rt ovary D1 25.0 mm  Rt ovary D2 16.7 mm  Rt ovary D3 35.9 mm  Rt ovary Vol 7.8 cm³  Lt ovary: Suboptimal      Impression  =========    Size consistent with dates.    No fetal anomalies were identified.    Amniotic fluid volume is normal.    Umbilical artery S/D ratio is  normal.      Recommendation  ===============    Continue in hospital management.      Coding  ======    Description: [id number] Detailed Ultrasound        Sonographer: Yaz Duke RDMS  Physician: Doug Milligan, MD, FACOG    Electronically signed by: Doug Milligan, MD, FACOG at: 2022/02/10 08:23        Orders (last 24 hrs)      Start     Ordered    02/10/22 1600  betamethasone acetate-betamethasone sodium phosphate (CELESTONE SOLUSPAN) injection 12 mg  Once         22 1832    02/10/22 0900  FLUoxetine (PROzac) capsule 10 mg  Daily         22 1834    02/10/22 0800  Inpatient Maternal & Fetal Medicine Consult  Once        Specialty:  Maternal and Fetal Medicine  Provider:  (Not yet assigned)    22 1817    02/10/22 0800  [facility]  Diagnostic Center  1 Time Imaging         22 1817    02/10/22 0800  metFORMIN (GLUCOPHAGE) tablet 500 mg  Daily With Breakfast,   Status:  Discontinued         22 1834    02/10/22 0713  POC Glucose Once  PROCEDURE ONCE         02/10/22 0710    02/10/22 0622  Diet Regular; Consistent Carbohydrate  Diet Effective Now         02/10/22 0622    02/10/22 0600  levothyroxine (SYNTHROID, LEVOTHROID) tablet 125 mcg  Every Early Morning         22 1834    02/10/22 0600  POC Glucose Finger 4x Daily Fasting & PC  4 Times Daily Fasting & After Meals       22 1955    02/10/22 0600  Preeclampsia Panel  Morning Draw         22 2250    02/10/22 0600  CBC & Differential  Morning Draw         22 2250    02/10/22 0600  CBC Auto Differential  PROCEDURE ONCE         22 2250    02/10/22 0430  lactated ringers infusion  Continuous         02/10/22 0340    22 2345  ursodiol (ACTIGALL) capsule 300 mg  3 Times Daily         22 2250    22 2245  metFORMIN (GLUCOPHAGE) tablet 500 mg  2 Times Daily With Meals         22 21422 2134  POC Glucose Once  PROCEDURE ONCE         22 2132    22 2100  sodium chloride 0.9  % flush 10 mL  Every 12 Hours Scheduled         02/09/22 1817 02/09/22 2037  ABO RH Specimen Verification  STAT         02/09/22 2036 02/09/22 1919  POC Glucose Once  PROCEDURE ONCE         02/09/22 1906    02/09/22 1915  dextrose 5 % and sodium chloride 0.2 % infusion  Continuous,   Status:  Discontinued         02/09/22 1817 02/09/22 1915  betamethasone acetate-betamethasone sodium phosphate (CELESTONE SOLUSPAN) injection 12 mg  Every 24 Hours,   Status:  Discontinued         02/09/22 1817 02/09/22 1915  magnesium sulfate 20 GM/500ML infusion  Continuous         02/09/22 1817 02/09/22 1833  Bile Acids, Total  Once         02/09/22 1832    02/09/22 1833  Protein, Urine, 24 Hour - Urine, Clean Catch  Once         02/09/22 1832    02/09/22 1833  Creatinine Clearance - Urine, Clean Catch  Once         02/09/22 1832    02/09/22 1833  Creatinine clearance serum  PROCEDURE ONCE         02/09/22 1832    02/09/22 1833  Creatinine Clearance, Urine, 24 Hour - Urine, Clean Catch  PROCEDURE ONCE         02/09/22 1832    02/09/22 1818  Code Status and Medical Interventions:  Continuous         02/09/22 1817 02/09/22 1818  Vital Signs Per hospital policy  Per Hospital Policy         02/09/22 1817 02/09/22 1818  For Antepartum Patients Greater Than 24 Weeks - NST Daily and Monitor Fetal Heart Tones for One Hour 3 Times Daily and PRN.  Per Order Details        Comments: For Antepartum Patients Greater Than 24 Weeks - NST Daily & Monitor Fetal Heart Tones For One Hour 3 Times Daily & PRN.    02/09/22 1817 02/09/22 1818  Notify Physician (specified)  Until Discontinued         02/09/22 1817 02/09/22 1818  Notify physician for hyperstimulus (per hospital algorithm)  Until Discontinued         02/09/22 1817 02/09/22 1818  Notify physician if membranes ruptured, bleeding greater than 1 pad an hour, fetal heart tone abnormality, and severe pain  Until Discontinued         02/09/22 1817 02/09/22 1818   Initiate Group Beta Strep (GBS) Prophylaxis Protocol, If Criteria Met  Continuous        Comments: NO TREATMENT RECOMMENDED IF: 1)  Maternal GBS status known negative 2)  Scheduled  birth with intact membranes, not in labor.  3 ) Maternal GBS unknown, no risk factors.   TREAT WITH ANTIBIOTICS IF:  1)  Maternal GBS status is known postive.  2)  Maternal GBS status unknown with these risk factors:  a)  Previous infant affected by GBS infection.  b)  GBS urinary tract infection (UTI) or bacteruria during pregnancy  c)  Unexplained maternal fever in labor (greater than or equal to 100.4F or 38.0C)  d)  Prolonged rupture of the membranes greater than or equal to 18 hours.  e)  Gestational age less than 37 weeks.    22 181  CBC (No Diff)  Once         22 18122 181  Type & Screen  Once         22 181  Preeclampsia Panel  Once         22 181  Insert Peripheral IV  Once         22 181  Saline Lock & Maintain IV Access  Continuous         22 181  Diet Regular  Diet Effective Now,   Status:  Canceled         22  lidocaine PF 1% (XYLOCAINE) injection 5 mL  As Needed         22 181  sodium chloride 0.9 % flush 1-10 mL  As Needed         22 181  acetaminophen (TYLENOL) tablet 650 mg  Every 4 Hours PRN         22 18122 181  hydrOXYzine (ATARAX) tablet 50 mg  Nightly PRN         22 1817    22 181  COVID PRE-OP / PRE-PROCEDURE SCREENING ORDER (NO ISOLATION) - Swab, Nasopharynx  Once         22 18122 1816  COVID-19, [facility] IN-HOUSE,NASAL Swab (NO TRANSPORT MEDIA) 2 HR TAT - Swab, Nasopharynx  PROCEDURE ONCE         22 1754  COVID PRE-OP / PRE-PROCEDURE SCREENING ORDER (NO ISOLATION) - Swab, Nasopharynx  Once,   Status:  Canceled         22  "17522 175  COVID-19 PCR, [facility], NP SWAB IN [facility]AL TRANSPORT MEDIA/ORAL SWISH 24-30 HR TAT - Swab, Nasopharynx  PROCEDURE ONCE,   Status:  Canceled         22 17522 1753  labetalol (NORMODYNE,TRANDATE) injection 20-80 mg  Every 10 Minutes PRN         22 1753    22 175  ondansetron (ZOFRAN) tablet 4 mg  Every 8 Hours PRN        \"Or\" Linked Group Details    22 1753    22 175  ondansetron (ZOFRAN) injection 4 mg  Every 8 Hours PRN        \"Or\" Linked Group Details    22 1753    02/09/22 1750  Place Sequential Compression Device  Once         22 1753    02/09/22 175  Maintain Sequential Compression Device  Continuous         22 1753    02/09/22 174  Admit To Obstetrics Inpatient  Once         22 1753    Unscheduled  Position Change - For Intra-Uterine Resusitation for Hypertonus, HyperStimulation or Non-Reassuring Fetal Status  As Needed       22 1817    --  levothyroxine (SYNTHROID, LEVOTHROID) 125 MCG tablet  Daily         22 174    --  metFORMIN (GLUCOPHAGE) 500 MG tablet  2 Times Daily With Meals         22    --  FLUoxetine (PROzac) 10 MG capsule  Daily         22 174    --  Prenatal Vit-Fe Fumarate-FA (Prenatal 27-1) 27-1 MG tablet tablet  Daily         22 174                   Physician Progress Notes (last 24 hours)      Marek Marinelli MD at 02/10/22 0747           Víctor Villeda  : 1980  MRN: [id number]  CSN: [id number]     Transfer from [facility] Day: 2    CC: Elevated liver enzymes     Antepartum Progress Note    Subjective   Carmenza reports feeling better this morning.  She had significant itching last night, but says is is better now, but it usually does not start up until around 1000 hrs.    She denies HA, vision changes, and RUQ pain.  +FM.           Objective     Min/max vitals past 24 hours:   Temp  Min: 97.9 °F (36.6 °C)  Max: 98.2 °F (36.8 °C)  BP  " Min: 106/66  Max: 136/84  Pulse  Min: 72  Max: 92  Resp  Min: 16  Max: 18         General: well developed; well nourished  no acute distress   Heart: regularly irregular rhythm  S1, S2 normal   Lungs: breathing is unlabored  clear to auscultation bilaterally   Abdomen: soft, non-tender; no masses  Normal findings: bowel sounds normal, gravid uterus    FHT's: reassuring and category 1   Cervix: was not checked.   Contractions: none     Lab Results   Component Value Date     02/10/2022    HGB 13.0 02/10/2022    HCT 38.7 02/10/2022    WBC 14.10 (H) 02/10/2022              Assessment   1. IUP at 33w5d  2. Likely cholestasis of pregnancy   3. R/O Pre-eclampsia   4. GHTN  5. Previous C/S   6. AMA    Plan   1. Reassuring fetal status - PDC USN  With no concerning findings  2. Repeat PEP pending  3. Continue Magnesium until therapeutic on steroids  4. BMZ x2 today  5. 24 hour urine will result later today  6. Started on Ursodiol 300 mg TID              Marek Marinelli MD  2/10/2022  07:47 EST           Electronically signed by Marek Marinelli MD at 02/10/22 0756          Consult Notes (last 24 hours)      Milligan, Douglas A, MD at 02/10/22 0802      Consult Orders    1. Inpatient Maternal & Fetal Medicine Consult [[id number]] ordered by Carl Duke III, MD at 22 1751               [facility]  Obstetric History and Physical  Referring Provider: Surendra Chester; MATT Chapman    Chief Complaint   Patient presents with   • Elevated Hepatic Enzymes       Subjective     Patient is a 41 y.o. female  currently at 33w5d, who presents with Itching, elevated BP and proteinuria.  Patient presented to [facility] with c/o itching.  Noted to have elevated BP and proteinuria.  AST, ALT elevated, as well as Uric Acid.  Patient placed on Mag and transferred to [facility].  On admission here, itching is improved, BPs are normal.  No HA, [place] or Epigastric pain.  Prior pregnancy complicated by preeclampsia.  Prior C/S,  plans repeat.  No family history of Preeclampsia or liver disease.  She does not smoke or use illicit drugs.    Her prenatal care is benign.  Her previous obstetric/gynecological history is noted for is non-contributory.        Prenatal Information:   Maternal Prenatal Labs  Blood Type ABO Type   Date Value Ref Range Status   02/09/2022 A  Final      Rh Status RH type   Date Value Ref Range Status   02/09/2022 Positive  Final      Antibody Screen Antibody Screen   Date Value Ref Range Status   02/09/2022 Negative  Final      Gonnorhea No results found for: GCCX   Chlamydia No results found for: CLAMYDCU   RPR No results found for: RPR       Rubella No results found for: RUBELLAIGGIN   Hepatitis B Surface Antigen No results found for: HEPBSAG   HIV-1 Antibody No results found for: LABHIV1   Hepatitis C Antibody No results found for: HEPCAB   Rapid Urin Drug Screen No results found for: AMPMETHU, BARBITSCNUR, LABBENZSCN, LABMETHSCN, LABOPIASCN, THCURSCR, COCAINEUR, AMPHETSCREEN, PROPOXSCN, BUPRENORSCNU, METAMPSCNUR, OXYCODONESCN, TRICYCLICSCN   Group B Strep Culture No results found for: GBSANTIGEN           External Prenatal Results     Pregnancy Outside Results - Transcribed From Office Records - See Scanned Records For Details     Test Value Date Time    ABO  A  02/09/22 2306    Rh  Positive  02/09/22 2306    Antibody Screen  Negative  02/09/22 2014    Varicella IgG       Rubella       Hgb  13.0 g/dL 02/10/22 0605       13.5 g/dL 02/09/22 2014    Hct  38.7 % 02/10/22 0605       39.4 % 02/09/22 2014    Glucose Fasting GTT       Glucose Tolerance Test 1 hour       Glucose Tolerance Test 3 hour       Gonorrhea (discrete)       Chlamydia (discrete)       RPR       VDRL       Syphilis Antibody       HBsAg       Herpes Simplex Virus PCR       Herpes Simplex VIrus Culture       HIV       Hep C RNA Quant PCR       Hep C Antibody       AFP       Group B Strep       GBS Susceptibility to Clindamycin       GBS Susceptibility  "to Erythromycin       Fetal Fibronectin       Genetic Testing, Maternal Blood             Drug Screening     Test Value Date Time    Urine Drug Screen       Amphetamine Screen       Barbiturate Screen       Benzodiazepine Screen       Methadone Screen       Phencyclidine Screen       Opiates Screen       THC Screen       Cocaine Screen       Propoxyphene Screen       Buprenorphine Screen       Methamphetamine Screen       Oxycodone Screen       Tricyclic Antidepressants Screen             Legend    ^: Historical                          Past OB History:       OB History    Para Term  AB Living   7 4 4 0 2 4   SAB IAB Ectopic Molar Multiple Live Births   1 0 0 0 0 4      # Outcome Date GA Lbr [place]/2nd Weight Sex Delivery Anes PTL Lv   7 Current            6 AB 19 8w0d    SAB      5 Term 10/12/15    F CS-Unspec   ELIJAH   4 Term 08 38w0d  3969 g (8 lb 12 oz) M Vag-Spont   ELIJAH   3 Term 05 39w4d  3685 g (8 lb 2 oz) F Vag-Spont   ELIJAH   2 Term 03 39w6d  3572 g (7 lb 14 oz) F Vag-Spont   ELIJAH   1 SAB  6w0d              Past Medical History: Past Medical History:   Diagnosis Date   • Anxiety    • Chlamydia     20 yrs ago   • Depression    • Disease of thyroid gland    • Gestational diabetes    • Preeclampsia    • Seizures (HCC)     last was 14 years ago \"stress induced\"      Past Surgical History Past Surgical History:   Procedure Laterality Date   •  SECTION     • DILATATION AND CURETTAGE     • KNEE SURGERY Bilateral        • WISDOM TOOTH EXTRACTION        Family History: No family history on file.   Social History:  reports that she has never smoked. She has never used smokeless tobacco.   reports previous alcohol use.   reports no history of drug use.        General ROS: Pertinent items are noted in HPI, all other systems reviewed and negative    Objective       Vital Signs Range for the last 24 hours  Temperature: Temp:  [97.8 °F (36.6 °C)-98.2 °F (36.8 °C)] 97.8 °F (36.6 °C) "   Temp Source: Temp src: Oral   BP: BP: (106-136)/(59-84) 131/74   Pulse: Heart Rate:  [72-92] 81   Respirations: Resp:  [16-18] 16               Weight: Weight:  [113 kg (250 lb)] 113 kg (250 lb)     Physical Examination: General appearance - alert, well appearing, and in no distress  Mental status - alert, oriented to person, place, and time  Eyes - sclera anicteric, left eye normal, right eye normal  Ears - right ear normal, left ear normal  Mouth - mucous membranes moist, pharynx normal without lesions  Neck - supple, no significant adenopathy  Chest - no tachypnea, retractions or cyanosis  Heart - normal rate and regular rhythm  Abdomen - Gravid, nontender  Back exam - full range of motion, no tenderness, palpable spasm or pain on motion  Neurological - alert, oriented, normal speech, no focal findings or movement disorder noted  Musculoskeletal - no joint tenderness, deformity or swelling  Extremities - peripheral pulses normal, no pedal edema, no clubbing or cyanosis  Skin - normal coloration and turgor, no rashes, no suspicious skin lesions noted    Presentation: Vertex   Cervix: Exam by:     Dilation:     Effacement:     Station:         Fetal Heart Rate Assessment   Method: Fetal HR Assessment Method: external   Beats/min: Fetal HR (beats/min): 145   Baseline: Fetal Heart Baseline Rate: normal range   Varibility: Fetal HR Variability: moderate (amplitude range 6 to 25 bpm)   Accels: Fetal HR Accelerations: prolonged, greater than/equal to 15 bpm, lasting at least 15 seconds   Decels: Fetal HR Decelerations: absent   Tracing Category:       Uterine Assessment   Method: Method: external tocotransducer   Frequency (min):     Ctx Count in 10 min:     Duration:     Intensity: Contraction Intensity: no contractions   Intensity by IUPC:     Resting Tone: Uterine Resting Tone: soft by palpation   Resting Tone by IUPC:     [place] Units:       Laboratory Results: elevated, AST, ALT, Uric Acid  Normal  PLTs  Radiology Review: S=D, normal PETROS and dopplers  See viewpoint by me  Other Studies: reactive tracing    Assessment/Plan       Gestational hypertension        Assessment:  1.  Intrauterine pregnancy at 33w5d weeks gestation with reactive fetal status.    2.  pre-eclampsia mild v cholestasis  3.  Obstetrical history significant for is non-contributory.  4.  GBS status: No results found for: GBSANTIGEN    Plan:  1. fetal and uterine monitoring  intermittent, maternal labs, 24 hour urine for  total protein and creatinine clearance and steroids for fetal benefits  2. Plan of care has been reviewed with patient.  3.  Risks, benefits of treatment plan have been discussed.  4.  All questions have been answered.      Douglas A. Milligan, MD  2/10/2022  08:02 EST    Electronically signed by Milligan, Douglas A, MD at 02/10/22 0811         FHR (last day)     Date/Time Fetal HR Assessment Method Fetal HR (beats/min) Fetal Heart Baseline Rate Fetal HR Variability Fetal HR Accelerations Fetal HR Decelerations Fetal HR Tracing Category    02/09/22 2200 external 145 normal range moderate (amplitude range 6 to 25 bpm) prolonged; greater than/equal to 15 bpm; lasting at least 15 seconds absent --    02/09/22 2130 external 135 normal range moderate (amplitude range 6 to 25 bpm) prolonged; greater than/equal to 15 bpm; lasting at least 15 seconds absent --    02/09/22 2100 external 125 normal range moderate (amplitude range 6 to 25 bpm) prolonged; greater than/equal to 15 bpm; lasting at least 15 seconds absent --    02/09/22 2030 external 120 normal range moderate (amplitude range 6 to 25 bpm) greater than/equal to 15 bpm; lasting at least 15 seconds absent --    02/09/22 2015 external 125 normal range moderate (amplitude range 6 to 25 bpm) greater than/equal to 15 bpm; lasting at least 15 seconds absent --    02/09/22 2000 external 115 normal range moderate (amplitude range 6 to 25 bpm) greater than/equal to 15 bpm; lasting at  least 15 seconds absent --    02/09/22 1945 external 120 normal range moderate (amplitude range 6 to 25 bpm) greater than/equal to 15 bpm; lasting at least 15 seconds absent --    02/09/22 1930 external  diff tracing/ pt sitting up to eat -- -- -- -- -- --    02/09/22 1915 external 120 normal range moderate (amplitude range 6 to 25 bpm) greater than/equal to 15 bpm; lasting at least 15 seconds absent --    02/09/22 1900 -- 130 -- moderate (amplitude range 6 to 25 bpm) greater than/equal to 15 bpm; lasting at least 15 seconds absent --    02/09/22 1843 external 135 -- moderate (amplitude range 6 to 25 bpm) greater than/equal to 15 bpm; lasting at least 15 seconds absent --    02/09/22 1830 external 130 -- moderate (amplitude range 6 to 25 bpm) greater than/equal to 15 bpm; lasting at least 15 seconds absent --    02/09/22 1815 external 130 -- moderate (amplitude range 6 to 25 bpm) greater than/equal to 15 bpm; lasting at least 15 seconds absent --    02/09/22 1800 external 130 -- moderate (amplitude range 6 to 25 bpm) greater than/equal to 15 bpm; lasting at least 15 seconds absent --    02/09/22 1745 external 130 -- moderate (amplitude range 6 to 25 bpm) greater than/equal to 15 bpm; lasting at least 15 seconds absent --        Uterine Activity (last day)     Date/Time Method Contraction Frequency (Minutes) Contraction Duration (sec) Contraction Intensity Uterine Resting Tone Contraction Pattern    02/09/22 2200 external tocotransducer -- -- no contractions -- --    02/09/22 2130 external tocotransducer -- -- no contractions -- --    02/09/22 2100 external tocotransducer -- -- -- -- --    02/09/22 2030 external tocotransducer -- -- -- -- --    02/09/22 2015 external tocotransducer -- -- -- -- --    02/09/22 2000 external tocotransducer -- -- -- -- --    02/09/22 1945 external tocotransducer -- -- no contractions soft by palpation --    02/09/22 1930 external tocotransducer -- -- -- -- --    02/09/22 1915 external  tocotransducer -- -- -- -- --    02/09/22 1900 -- -- -- -- soft by palpation --    02/09/22 1843 external tocotransducer -- -- no contractions -- --    02/09/22 1830 external tocotransducer -- -- no contractions -- --    02/09/22 1815 external tocotransducer -- -- no contractions -- --    02/09/22 1800 external tocotransducer -- -- no contractions -- --    02/09/22 1745 external tocotransducer -- -- no contractions -- --

## 2022-02-10 NOTE — PROGRESS NOTES
NABILA Renee  Carmenza Villeda  : 1980  MRN: [id number]  CSN: [id number]     Transfer from [facility] Day: 2    CC: Elevated liver enzymes     Antepartum Progress Note    Subjective   Carmenza reports feeling better this morning.  She had significant itching last night, but says is is better now, but it usually does not start up until around 1000 hrs.    She denies HA, vision changes, and RUQ pain.  +FM.            Objective     Min/max vitals past 24 hours:   Temp  Min: 97.9 °F (36.6 °C)  Max: 98.2 °F (36.8 °C)  BP  Min: 106/66  Max: 136/84  Pulse  Min: 72  Max: 92  Resp  Min: 16  Max: 18         General: well developed; well nourished  no acute distress   Heart: regularly irregular rhythm  S1, S2 normal   Lungs: breathing is unlabored  clear to auscultation bilaterally   Abdomen: soft, non-tender; no masses  Normal findings: bowel sounds normal, gravid uterus    FHT's: reassuring and category 1   Cervix: was not checked.   Contractions: none     Lab Results   Component Value Date     02/10/2022    HGB 13.0 02/10/2022    HCT 38.7 02/10/2022    WBC 14.10 (H) 02/10/2022               Assessment   1. IUP at 33w5d  2. Likely cholestasis of pregnancy   3. R/O Pre-eclampsia   4. GHTN  5. Previous C/S   6. AMA     Plan   1. Reassuring fetal status - PDC USN  With no concerning findings  2. Repeat PEP pending  3. Continue Magnesium until therapeutic on steroids  4. BMZ x2 today  5. 24 hour urine will result later today  6. Started on Ursodiol 300 mg TID              Marek Marinelli MD  2/10/2022  07:47 EST

## 2022-02-10 NOTE — PLAN OF CARE
Problem: Adult Inpatient Plan of Care  Goal: Plan of Care Review  Outcome: Ongoing, Progressing  Flowsheets (Taken 2/10/2022 0358)  Plan of Care Reviewed With: patient  Goal: Patient-Specific Goal (Individualized)  Outcome: Ongoing, Progressing  Goal: Absence of Hospital-Acquired Illness or Injury  Outcome: Ongoing, Progressing  Intervention: Prevent Skin Injury  Recent Flowsheet Documentation  Taken 2/9/2022 1945 by Lizzie Montero RN  Skin Protection: incontinence pads utilized  Goal: Optimal Comfort and Wellbeing  Outcome: Ongoing, Progressing  Intervention: Provide Person-Centered Care  Recent Flowsheet Documentation  Taken 2/9/2022 1945 by Lizzie Montero RN  Trust Relationship/Rapport:   care explained   choices provided   thoughts/feelings acknowledged  Goal: Readiness for Transition of Care  Outcome: Ongoing, Progressing   Goal Outcome Evaluation:  Plan of Care Reviewed With: patient

## 2022-02-11 PROBLEM — K83.1 INTRAHEPATIC CHOLESTASIS OF PREGNANCY: Status: ACTIVE | Noted: 2022-02-11

## 2022-02-11 PROBLEM — O26.619 INTRAHEPATIC CHOLESTASIS OF PREGNANCY: Status: ACTIVE | Noted: 2022-02-11

## 2022-02-11 LAB
ALP SERPL-CCNC: 212 U/L (ref 39–117)
ALT SERPL W P-5'-P-CCNC: 311 U/L (ref 1–33)
AST SERPL-CCNC: 215 U/L (ref 1–32)
BASOPHILS # BLD AUTO: 0.02 10*3/MM3 (ref 0–0.2)
BASOPHILS NFR BLD AUTO: 0.2 % (ref 0–1.5)
BILE AC SERPL-SCNC: 26.7 UMOL/L (ref 0–10)
BILIRUB SERPL-MCNC: 0.4 MG/DL (ref 0–1.2)
CREAT SERPL-MCNC: 0.58 MG/DL (ref 0.57–1)
DEPRECATED RDW RBC AUTO: 48.5 FL (ref 37–54)
EOSINOPHIL # BLD AUTO: 0.01 10*3/MM3 (ref 0–0.4)
EOSINOPHIL NFR BLD AUTO: 0.1 % (ref 0.3–6.2)
ERYTHROCYTE [DISTWIDTH] IN BLOOD BY AUTOMATED COUNT: 13.4 % (ref 12.3–15.4)
GLUCOSE BLDC GLUCOMTR-MCNC: 102 MG/DL (ref 70–130)
GLUCOSE BLDC GLUCOMTR-MCNC: 109 MG/DL (ref 70–130)
GLUCOSE BLDC GLUCOMTR-MCNC: 95 MG/DL (ref 70–130)
GLUCOSE BLDC GLUCOMTR-MCNC: 97 MG/DL (ref 70–130)
HCT VFR BLD AUTO: 39.3 % (ref 34–46.6)
HGB BLD-MCNC: 12.7 G/DL (ref 12–15.9)
IMM GRANULOCYTES # BLD AUTO: 0.17 10*3/MM3 (ref 0–0.05)
IMM GRANULOCYTES NFR BLD AUTO: 1.3 % (ref 0–0.5)
LDH SERPL-CCNC: 251 U/L (ref 135–214)
LYMPHOCYTES # BLD AUTO: 1.48 10*3/MM3 (ref 0.7–3.1)
LYMPHOCYTES NFR BLD AUTO: 11.4 % (ref 19.6–45.3)
MCH RBC QN AUTO: 31.6 PG (ref 26.6–33)
MCHC RBC AUTO-ENTMCNC: 32.3 G/DL (ref 31.5–35.7)
MCV RBC AUTO: 97.8 FL (ref 79–97)
MONOCYTES # BLD AUTO: 0.56 10*3/MM3 (ref 0.1–0.9)
MONOCYTES NFR BLD AUTO: 4.3 % (ref 5–12)
NEUTROPHILS NFR BLD AUTO: 10.74 10*3/MM3 (ref 1.7–7)
NEUTROPHILS NFR BLD AUTO: 82.7 % (ref 42.7–76)
NRBC BLD AUTO-RTO: 0 /100 WBC (ref 0–0.2)
PLATELET # BLD AUTO: 272 10*3/MM3 (ref 140–450)
PMV BLD AUTO: 10.6 FL (ref 6–12)
RBC # BLD AUTO: 4.02 10*6/MM3 (ref 3.77–5.28)
URATE SERPL-MCNC: 5 MG/DL (ref 2.4–5.7)
WBC NRBC COR # BLD: 12.98 10*3/MM3 (ref 3.4–10.8)

## 2022-02-11 PROCEDURE — 82962 GLUCOSE BLOOD TEST: CPT

## 2022-02-11 PROCEDURE — 99232 SBSQ HOSP IP/OBS MODERATE 35: CPT | Performed by: OBSTETRICS & GYNECOLOGY

## 2022-02-11 PROCEDURE — 85025 COMPLETE CBC W/AUTO DIFF WBC: CPT | Performed by: OBSTETRICS & GYNECOLOGY

## 2022-02-11 PROCEDURE — 84075 ASSAY ALKALINE PHOSPHATASE: CPT | Performed by: OBSTETRICS & GYNECOLOGY

## 2022-02-11 PROCEDURE — 84460 ALANINE AMINO (ALT) (SGPT): CPT | Performed by: OBSTETRICS & GYNECOLOGY

## 2022-02-11 PROCEDURE — 59025 FETAL NON-STRESS TEST: CPT

## 2022-02-11 PROCEDURE — 83615 LACTATE (LD) (LDH) ENZYME: CPT | Performed by: OBSTETRICS & GYNECOLOGY

## 2022-02-11 PROCEDURE — 84550 ASSAY OF BLOOD/URIC ACID: CPT | Performed by: OBSTETRICS & GYNECOLOGY

## 2022-02-11 PROCEDURE — 82565 ASSAY OF CREATININE: CPT | Performed by: OBSTETRICS & GYNECOLOGY

## 2022-02-11 PROCEDURE — 84450 TRANSFERASE (AST) (SGOT): CPT | Performed by: OBSTETRICS & GYNECOLOGY

## 2022-02-11 PROCEDURE — 82247 BILIRUBIN TOTAL: CPT | Performed by: OBSTETRICS & GYNECOLOGY

## 2022-02-11 RX ORDER — URSODIOL 300 MG/1
600 CAPSULE ORAL 3 TIMES DAILY
Status: DISCONTINUED | OUTPATIENT
Start: 2022-02-11 | End: 2022-02-13 | Stop reason: HOSPADM

## 2022-02-11 RX ADMIN — URSODIOL 600 MG: 300 CAPSULE ORAL at 16:25

## 2022-02-11 RX ADMIN — METFORMIN HYDROCHLORIDE 500 MG: 500 TABLET ORAL at 17:37

## 2022-02-11 RX ADMIN — URSODIOL 600 MG: 300 CAPSULE ORAL at 21:03

## 2022-02-11 RX ADMIN — SODIUM CHLORIDE, PRESERVATIVE FREE 10 ML: 5 INJECTION INTRAVENOUS at 21:03

## 2022-02-11 RX ADMIN — LEVOTHYROXINE SODIUM 125 MCG: 25 TABLET ORAL at 06:49

## 2022-02-11 RX ADMIN — METFORMIN HYDROCHLORIDE 500 MG: 500 TABLET ORAL at 08:12

## 2022-02-11 RX ADMIN — HYDROXYZINE HYDROCHLORIDE 50 MG: 25 TABLET ORAL at 23:31

## 2022-02-11 RX ADMIN — FLUOXETINE 10 MG: 10 CAPSULE ORAL at 09:30

## 2022-02-11 RX ADMIN — URSODIOL 600 MG: 300 CAPSULE ORAL at 09:30

## 2022-02-11 NOTE — PROGRESS NOTES
Maternal-Fetal Medicine   Progress Note    Patient name: Carmenza Villeda  YOB: 1980   MRN: [id number]  Admission Date: 2022  Date of Service: 2022  Referring Provider: Surendra Chester ([facility])      Carmenza Villeda is a 41 y.o.    at 33w6d  admitted on 2022 for Intrahepatic cholestasis of pregnancy    Hospital day 2    Chief Complaint   Patient presents with   • Elevated Hepatic Enzymes       Diagnoses:   Patient Active Problem List   Diagnosis   • Gestational hypertension   • Maternal care due to low transverse uterine scar from previous  delivery   • Multigravida of advanced maternal age in third trimester   • Intrahepatic cholestasis of pregnancy       Subjective:      Carmenza has complaints today of continued itching but it has clearly improved since initiation of Actigall therapy..   Patient denies  headache:   Patient denies  right upper quadrant pain:   Reports fetal movement is normal  Denies leakage of amniotic fluid.  Denies vaginal bleeding    Objective:     Vital signs:  Temp:  [98 °F (36.7 °C)-98.4 °F (36.9 °C)] 98 °F (36.7 °C)  Heart Rate:  [] 82  Resp:  [16-18] 16  BP: (104-137)/(58-89) 121/78    Abdomen: soft, nontender  Uterus: gravid, nontender, estimated fetal weight 5 to 6 pounds.  Extremities: nontender; no edema     Fetal heart rate tracing review  FHT's: Category 1  TOCO: irregular    Cervix: last check      Most recent ultrasound:  On admission    Medications:  FLUoxetine, 10 mg, Oral, Daily  levothyroxine, 125 mcg, Oral, Q AM  metFORMIN, 500 mg, Oral, BID With Meals  sodium chloride, 10 mL, Intravenous, Q12H  ursodiol, 600 mg, Oral, TID       •  acetaminophen  •  hydrOXYzine  •  lidocaine PF 1%  •  ondansetron **OR** ondansetron  •  sodium chloride    Labs:  Lab Results   Component Value Date    HGB 12.7 2022     No results found for: GLUCOSE  A.m. labs 2022  , , uric acid 5.0, platelet count 272.  The bile salt evaluation  remains pending    Assessment/Plan:      Carmenza is a 41 y.o.    at 33w6d.  1. Intrahepatic cholestasis of pregnancy: The bile salts evaluation will need to return elevated to confirm the diagnosis of ICP but given her response to Actigall therapy and her initial presentation with itching in her palms and on the soles of her feet, I believe this is the most likely diagnosis  2.  Previous  delivery x1  3.  Advanced maternal age, multipara  4.  Gestational diabetes  5.  24-hour urine with protein excretion of 211 mg/day    Plan:   1.  Discontinue magnesium sulfate  2.  Increase Actigall 600 mg p.o. 3 times daily  3.  Await results of bile salts to confirm diagnosis of ICP  4.  If ICP confirmed by laboratory evaluation would consider delivery by 37 weeks gestation    I spent 15 minutes with this patient of which greater than 50% was face to face counseling and coordination of care.  All questions were answered to the best of my ablity.    Gideon Benson MD  2022  09:18 EST

## 2022-02-11 NOTE — PROGRESS NOTES
NABILA Renee  Carmenza Villeda  : 1980  MRN: [id number]  CSN: [id number]     Transfer from [facility] Day: 3    CC:  Cholestasis of pregnancy     Antepartum Progress Note    Subjective   Carmenza says she feels great.  Her itching has significantly improved since taking the Ursodiol.  She denies headache, vision changes, and RUQ pain.  She denies contractions and vaginal leaking. She denies SOA and chest pain.          Objective     Min/max vitals past 24 hours:   Temp  Min: 98 °F (36.7 °C)  Max: 98.4 °F (36.9 °C)  BP  Min: 104/58  Max: 137/89  Pulse  Min: 68  Max: 100  Resp  Min: 16  Max: 18         General: well developed; well nourished  no acute distress   Heart: regular rate and rhythm  S1, S2 normal   Lungs: breathing is unlabored  clear to auscultation bilaterally   Abdomen: soft, non-tender; no masses  Normal findings: bowel sounds normal   FHT's: reassuring and category 1   Cervix: was not checked.   Contractions: none       Lab Results   Component Value Date     2022    HGB 12.7 2022    HCT 39.3 2022    WBC 12.98 (H) 2022     Lab Results   Component Value Date    HGB 12.7 2022     2022     (H) 2022     (H) 2022     (H) 2022    URICACID 5.0 2022    CREATININE 0.58 2022               Assessment   1. IUP at 33w6d  2. Cholestasis of pregnancy       Plan   1. Continue in house management   2. Will be therapeutic on steroids this afternoon.  3. 24 hour urine is negative at 211  4. Bile salts pending.  5. Will discuss with Dr. Benson as to further management.                     Marek Marinelli MD  2022  07:59 EST

## 2022-02-11 NOTE — CASE MANAGEMENT/SOCIAL WORK
Continued Stay Note  [facility]     Patient Name: Carmenza Villeda  MRN: [id number]  Today's Date: 2/11/2022    Admit Date: 2/9/2022     Discharge Plan     Row Name 02/11/22 0742       Plan    Plan MSW available    Plan Comments Pt refused UDS at admission. Per policy cord stat will be sent after delivery.               Discharge Codes    No documentation.                     Leslie Sims, MSW

## 2022-02-12 LAB
GLUCOSE BLDC GLUCOMTR-MCNC: 82 MG/DL (ref 70–130)
GLUCOSE BLDC GLUCOMTR-MCNC: 84 MG/DL (ref 70–130)
GLUCOSE BLDC GLUCOMTR-MCNC: 90 MG/DL (ref 70–130)
GLUCOSE BLDC GLUCOMTR-MCNC: 98 MG/DL (ref 70–130)

## 2022-02-12 PROCEDURE — 99231 SBSQ HOSP IP/OBS SF/LOW 25: CPT | Performed by: OBSTETRICS & GYNECOLOGY

## 2022-02-12 PROCEDURE — 59025 FETAL NON-STRESS TEST: CPT | Performed by: OBSTETRICS & GYNECOLOGY

## 2022-02-12 PROCEDURE — 59025 FETAL NON-STRESS TEST: CPT

## 2022-02-12 PROCEDURE — 82962 GLUCOSE BLOOD TEST: CPT

## 2022-02-12 RX ADMIN — URSODIOL 600 MG: 300 CAPSULE ORAL at 08:31

## 2022-02-12 RX ADMIN — METFORMIN HYDROCHLORIDE 500 MG: 500 TABLET ORAL at 08:30

## 2022-02-12 RX ADMIN — URSODIOL 600 MG: 300 CAPSULE ORAL at 17:00

## 2022-02-12 RX ADMIN — SODIUM CHLORIDE, PRESERVATIVE FREE 10 ML: 5 INJECTION INTRAVENOUS at 08:32

## 2022-02-12 RX ADMIN — METFORMIN HYDROCHLORIDE 500 MG: 500 TABLET ORAL at 17:01

## 2022-02-12 RX ADMIN — HYDROXYZINE HYDROCHLORIDE 50 MG: 25 TABLET ORAL at 21:52

## 2022-02-12 RX ADMIN — LEVOTHYROXINE SODIUM 125 MCG: 25 TABLET ORAL at 05:45

## 2022-02-12 RX ADMIN — FLUOXETINE 10 MG: 10 CAPSULE ORAL at 08:30

## 2022-02-12 RX ADMIN — URSODIOL 600 MG: 300 CAPSULE ORAL at 21:12

## 2022-02-12 NOTE — PROGRESS NOTES
Daily Progress Note    Patient name: Carmenza Villeda  YOB: 1980   MRN: [id number]  Admission Date: 2022  Date of Service: 2022  Referring Provider: Surendra Chester MD    Carmenza Villeda is a 41 y.o.    at 34w0d  admitted on 2022 for Intrahepatic cholestasis of pregnancy    Hospital day 3      Diagnoses:   Patient Active Problem List    Diagnosis    • *Intrahepatic cholestasis of pregnancy [O26.619, K83.1]    • Maternal care due to low transverse uterine scar from previous  delivery [O34.211]    • Multigravida of advanced maternal age in third trimester [O09.523]    • Gestational hypertension [O13.9]        Chief Complaint:  Chief Complaint   Patient presents with   • Elevated Hepatic Enzymes       Subjective:      Carmenza reports itching is improving  Reports fetal movement is normal  Denies leakage of amniotic fluid.  Denies vaginal bleeding    Objective:     Vital signs:  Temp:  [97.7 °F (36.5 °C)-98.1 °F (36.7 °C)] 97.7 °F (36.5 °C)  Heart Rate:  [53-89] 56  Resp:  [16-18] 16  BP: (103-126)/(55-85) 119/68    Abdomen: soft, nontender  Uterus: gravid, nontender  Extremities: nontender; no edema        Non-Stress Test:    Fetal Heart Rate Assessment   Method: Fetal HR Assessment Method: external   Beats/min: Fetal HR (beats/min): 135   Baseline: Fetal Heart Baseline Rate: normal range   Variability: Fetal HR Variability: moderate (amplitude range 6 to 25 bpm)   Accels: Fetal HR Accelerations: greater than/equal to 15 bpm, lasting at least 15 seconds   Decels: Fetal HR Decelerations: absent   Tracing Category:     NST-indications cholestasis of pregnancy, interpretation reactive, moderate variability, multiple accelerations 15 x 15, no fetal sideration noted, onset 0930 endtime 0955 no ctx  Uterine Assessment   Method: Method: external tocotransducer, palpation, per patient report   Frequency (min):     Ctx Count in 10 min:     Duration:     Intensity: Contraction Intensity: no  contractions (Last hour)   Intensity by IUPC:     Resting Tone: Uterine Resting Tone: soft by palpation   Resting Tone by IUPC:     Winifred Units:       Cervix: Exam by:     Dilation:     Effacement:     Station:           Medications:  FLUoxetine, 10 mg, Oral, Daily  levothyroxine, 125 mcg, Oral, Q AM  metFORMIN, 500 mg, Oral, BID With Meals  sodium chloride, 10 mL, Intravenous, Q12H  ursodiol, 600 mg, Oral, TID       •  acetaminophen  •  hydrOXYzine  •  lidocaine PF 1%  •  ondansetron **OR** ondansetron  •  sodium chloride    Labs:  Lab Results (last 24 hours)     Procedure Component Value Units Date/Time    POC Glucose Once [[id number]]  (Normal) Collected: 22 0743    Specimen: Blood Updated: 22 0748     Glucose 84 mg/dL      Comment: Meter: [id number] : [id number] Luis Taveras       POC Glucose Once [[id number]]  (Normal) Collected: 22    Specimen: Blood Updated: 22     Glucose 95 mg/dL      Comment: Meter: [id number] : [id number] Domingo Garcia       Bile Acids, Total [[id number]]  (Abnormal) Collected: 22    Specimen: Blood Updated: 22 181     Bile Acids Total 26.7 umol/L     Narrative:      Performed at:  [address] [place] [address]  [id number]  : Amaya Veliz MD, Phone:  [phone]    POC Glucose Once [[id number]]  (Normal) Collected: 22 1419    Specimen: Blood Updated: 22 1421     Glucose 102 mg/dL      Comment: Meter: [id number] : [id number] Christiane Velazquez       POC Glucose Once [[id number]]  (Normal) Collected: 2258    Specimen: Blood Updated: 22     Glucose 109 mg/dL      Comment: Meter: [id number] : [id number] Christiane Velazquez           Lab Results   Component Value Date    HGB 12.7 2022         Assessment/Plan:      Carmenza is a 41 y.o.    at 34w0d.  1. Intrahepatic cholestasis of pregnancy: Confirmed bile acids 26.7      -Patient states pruritus is  improving      -Repeat labs in a.m.      -Increase Actigall if needed max and 2100 mg daily  2. :  Blood pressure remains normal with no evidence of preeclampsia.  3. Fetal: NSTs reactive continue 3 times daily monitoring.  4.  Prior C/S X 1    All questions were answered to the best of my ability.    Horacio Marina, DO  2/12/2022

## 2022-02-13 ENCOUNTER — HOSPITAL ENCOUNTER (OUTPATIENT)
Facility: HOSPITAL | Age: 42
End: 2022-02-13
Attending: OBSTETRICS & GYNECOLOGY | Admitting: OBSTETRICS & GYNECOLOGY

## 2022-02-13 VITALS
RESPIRATION RATE: 16 BRPM | DIASTOLIC BLOOD PRESSURE: 74 MMHG | TEMPERATURE: 97.7 F | OXYGEN SATURATION: 97 % | HEIGHT: 69 IN | SYSTOLIC BLOOD PRESSURE: 120 MMHG | BODY MASS INDEX: 37.03 KG/M2 | HEART RATE: 62 BPM | WEIGHT: 250 LBS

## 2022-02-13 LAB
ALP SERPL-CCNC: 205 U/L (ref 39–117)
ALT SERPL W P-5'-P-CCNC: 276 U/L (ref 1–33)
AST SERPL-CCNC: 129 U/L (ref 1–32)
BASOPHILS # BLD MANUAL: 0 10*3/MM3 (ref 0–0.2)
BASOPHILS NFR BLD MANUAL: 0 % (ref 0–1.5)
BILIRUB SERPL-MCNC: 0.3 MG/DL (ref 0–1.2)
CREAT SERPL-MCNC: 0.55 MG/DL (ref 0.57–1)
DEPRECATED RDW RBC AUTO: 47.8 FL (ref 37–54)
EOSINOPHIL # BLD MANUAL: 0.12 10*3/MM3 (ref 0–0.4)
EOSINOPHIL NFR BLD MANUAL: 1 % (ref 0.3–6.2)
ERYTHROCYTE [DISTWIDTH] IN BLOOD BY AUTOMATED COUNT: 13.4 % (ref 12.3–15.4)
GLUCOSE BLDC GLUCOMTR-MCNC: 79 MG/DL (ref 70–130)
HCT VFR BLD AUTO: 40.3 % (ref 34–46.6)
HGB BLD-MCNC: 12.9 G/DL (ref 12–15.9)
LARGE PLATELETS: ABNORMAL
LDH SERPL-CCNC: 198 U/L (ref 135–214)
LYMPHOCYTES # BLD MANUAL: 1.77 10*3/MM3 (ref 0.7–3.1)
LYMPHOCYTES NFR BLD MANUAL: 5 % (ref 5–12)
MCH RBC QN AUTO: 30.9 PG (ref 26.6–33)
MCHC RBC AUTO-ENTMCNC: 32 G/DL (ref 31.5–35.7)
MCV RBC AUTO: 96.6 FL (ref 79–97)
MONOCYTES # BLD: 0.59 10*3/MM3 (ref 0.1–0.9)
NEUTROPHILS # BLD AUTO: 9.33 10*3/MM3 (ref 1.7–7)
NEUTROPHILS NFR BLD MANUAL: 77 % (ref 42.7–76)
NEUTS BAND NFR BLD MANUAL: 2 % (ref 0–5)
PLATELET # BLD AUTO: 262 10*3/MM3 (ref 140–450)
PMV BLD AUTO: 10.6 FL (ref 6–12)
RBC # BLD AUTO: 4.17 10*6/MM3 (ref 3.77–5.28)
RBC MORPH BLD: NORMAL
URATE SERPL-MCNC: 5.3 MG/DL (ref 2.4–5.7)
VARIANT LYMPHS NFR BLD MANUAL: 15 % (ref 19.6–45.3)
WBC MORPH BLD: NORMAL
WBC NRBC COR # BLD: 11.81 10*3/MM3 (ref 3.4–10.8)

## 2022-02-13 PROCEDURE — 83615 LACTATE (LD) (LDH) ENZYME: CPT | Performed by: OBSTETRICS & GYNECOLOGY

## 2022-02-13 PROCEDURE — 84075 ASSAY ALKALINE PHOSPHATASE: CPT | Performed by: OBSTETRICS & GYNECOLOGY

## 2022-02-13 PROCEDURE — 84550 ASSAY OF BLOOD/URIC ACID: CPT | Performed by: OBSTETRICS & GYNECOLOGY

## 2022-02-13 PROCEDURE — 59025 FETAL NON-STRESS TEST: CPT

## 2022-02-13 PROCEDURE — 82247 BILIRUBIN TOTAL: CPT | Performed by: OBSTETRICS & GYNECOLOGY

## 2022-02-13 PROCEDURE — 84450 TRANSFERASE (AST) (SGOT): CPT | Performed by: OBSTETRICS & GYNECOLOGY

## 2022-02-13 PROCEDURE — 82962 GLUCOSE BLOOD TEST: CPT

## 2022-02-13 PROCEDURE — 84460 ALANINE AMINO (ALT) (SGPT): CPT | Performed by: OBSTETRICS & GYNECOLOGY

## 2022-02-13 PROCEDURE — 82565 ASSAY OF CREATININE: CPT | Performed by: OBSTETRICS & GYNECOLOGY

## 2022-02-13 PROCEDURE — 85027 COMPLETE CBC AUTOMATED: CPT | Performed by: OBSTETRICS & GYNECOLOGY

## 2022-02-13 PROCEDURE — 85007 BL SMEAR W/DIFF WBC COUNT: CPT | Performed by: OBSTETRICS & GYNECOLOGY

## 2022-02-13 PROCEDURE — 99238 HOSP IP/OBS DSCHRG MGMT 30/<: CPT | Performed by: OBSTETRICS & GYNECOLOGY

## 2022-02-13 RX ORDER — URSODIOL 300 MG/1
600 CAPSULE ORAL 3 TIMES DAILY
Qty: 65 CAPSULE | Refills: 0 | Status: SHIPPED | OUTPATIENT
Start: 2022-02-13

## 2022-02-13 RX ORDER — HYDROXYZINE 50 MG/1
50 TABLET, FILM COATED ORAL EVERY 6 HOURS PRN
Qty: 60 TABLET | Refills: 1 | Status: SHIPPED | OUTPATIENT
Start: 2022-02-13

## 2022-02-13 RX ADMIN — LEVOTHYROXINE SODIUM 125 MCG: 25 TABLET ORAL at 05:53

## 2022-02-13 RX ADMIN — URSODIOL 600 MG: 300 CAPSULE ORAL at 08:37

## 2022-02-13 RX ADMIN — FLUOXETINE 10 MG: 10 CAPSULE ORAL at 08:37

## 2022-02-13 RX ADMIN — METFORMIN HYDROCHLORIDE 500 MG: 500 TABLET ORAL at 07:45

## 2022-02-13 NOTE — DISCHARGE SUMMARY
Carmenza Villeda  [id number]  1980    Referring physician: Surendra Chester M.D.    Chief complaint: elevated liver enzymes    Date if admission: 2022  Date of discharge: 2022    Procedures: PDC Ultrasound (2022)    Consultations: Maternal Fetal Medicine    S/ No complaints.  Itching nearly resolved.  ROS neg for HA, CP, SOB, N, V,    Contractions, bleeding, or leaking.  Good FM  O/ 120/74, 16, 62, 97.7   Lungs: CTA   Heart: RRR, no m,g,r   Abd: +BS, soft, non-tend   Fund: soft, non-tend   Ext: no calf tend   FHT's: indication: cholestasis, onset , offset , baseline 130, mod BTB    Variability, mult accels (15 X 15), no decels, no contractions,     Interpretation: reactive NST   Labs: , , CBC normal    A/1)IUP 34 1/7 weeks     2)intrahepatic cholestasis of pregnancy- symptoms nearly resolved on    Actigal 600mg tid     3)gestational hypertension- BP's stable on no meds     4)gestational diabetes- BS's normal on metformin     5)hypothyroidism- stable on synthroid     6)adv mat age     7)prev      8)obesity    Hospital course: Pt was admitted and continued on magnesium through steroid window.  Magnesium was discontinued and pt started on Actigal 300mg tid for presumed cholestasis.  Her bile acids returned elevated and actigal was increased until symptoms improved.  Effective dose of Actigal was 600mg tid.  Pt's accuchecks have been normal during this admission.  Pt is discharged today.  Pt needs to be seen Tues with NST and repeat labs.  Pt should be delivered at 36-37 weeks    P/1)d/c home     2)d/c meds Actigal 600mg tid, continue metformin, synthroid, and others     3)biweekly NST's in [place]     4)deliver at 36-37 weeks    Matteo Branch MD  2022  07:36 EST

## 2022-02-14 NOTE — PAYOR COMM NOTE
"Carmenza Villeda (41 y.o. Female)     Auth#[id number]    Discharged 2/13/22.    From:Angella Burkett LPN, Utilization Review  Phone #[phone]  Fax #[phone]              Date of Birth Social Security Number Address Home Phone MRN    1980  [address] [facility] [address] [phone] [id number]    L[employer]-day Marital Status             Confucianism        Admission Date Admission Type Admitting Provider Attending Provider Department, Room/Bed    2/9/22 Elective Carl Duke III, MD  [facility] ANTEPARTUM, N325/1    Discharge Date Discharge Disposition Discharge Destination          2/13/2022 Home or Self Care              Attending Provider: (none)   Allergies: Penicillins    Isolation: None   Infection: None   Code Status: Prior   Advance Care Planning Activity    Ht: 175.3 cm (69\")   Wt: 113 kg (250 lb)    Admission Cmt: None   Principal Problem: Intrahepatic cholestasis of pregnancy [O26.619,K83.1]                 Active Insurance as of 2/9/2022     Primary Coverage     Payor Plan Insurance Group Employer/Plan Group    [employer]HE[employer] MEDICAID [employer] MEDICAID [id number]     Payor Plan Address Payor Plan Phone Number Payor Plan Fax Number Effective Dates    [address] [address] [phone]  8/1/2021 - None Entered    [facility] [address]       Subscriber Name Subscriber Birth Date Member ID       CARMENZA VILLEDA 1980 [id number]                 Emergency Contacts      (Rel.) Home Phone Work Phone Mobile Phone    GUTIERREZ SIMON (Spouse) -- -- [phone]               Discharge Summary      Matteo Branch MD at 02/13/22 0736          Carmenza Villeda  [id number]  1980    Referring physician: Surendra Chester M.D.    Chief complaint: elevated liver enzymes    Date if admission: 2/9/2022  Date of discharge: 2/13/2022    Procedures: PDC Ultrasound (2/11/2022)    Consultations: Maternal Fetal Medicine    S/ No complaints.  Itching nearly resolved.  ROS neg for HA, CP, " SOB, N, V,    Contractions, bleeding, or leaking.  Good FM  O/ 120/74, 16, 62, 97.7   Lungs: CTA   Heart: RRR, no m,g,r   Abd: +BS, soft, non-tend   Fund: soft, non-tend   Ext: no calf tend   FHT's: indication: cholestasis, onset , offset , baseline 130, mod BTB    Variability, mult accels (15 X 15), no decels, no contractions,     Interpretation: reactive NST   Labs: , , CBC normal    A/1)IUP 34 1/7 weeks     2)intrahepatic cholestasis of pregnancy- symptoms nearly resolved on    Actigal 600mg tid     3)gestational hypertension- BP's stable on no meds     4)gestational diabetes- BS's normal on metformin     5)hypothyroidism- stable on synthroid     6)adv mat age     7)prev      8)obesity    Hospital course: Pt was admitted and continued on magnesium through steroid window.  Magnesium was discontinued and pt started on Actigal 300mg tid for presumed cholestasis.  Her bile acids returned elevated and actigal was increased until symptoms improved.  Effective dose of Actigal was 600mg tid.  Pt's accuchecks have been normal during this admission.  Pt is discharged today.  Pt needs to be seen Tues with NST and repeat labs.  Pt should be delivered at 36-37 weeks    P/1)d/c home     2)d/c meds Actigal 600mg tid, continue metformin, synthroid, and others     3)biweekly NST's in [place]     4)deliver at 36-37 weeks    Matteo Branch MD  2022  07:36 EST        Electronically signed by Matteo Branch MD at 22 1263